# Patient Record
Sex: FEMALE | Race: WHITE
[De-identification: names, ages, dates, MRNs, and addresses within clinical notes are randomized per-mention and may not be internally consistent; named-entity substitution may affect disease eponyms.]

---

## 2017-01-11 ENCOUNTER — HOSPITAL ENCOUNTER (OUTPATIENT)
Dept: HOSPITAL 62 - END | Age: 77
Discharge: HOME | End: 2017-01-11
Attending: INTERNAL MEDICINE
Payer: MEDICARE

## 2017-01-11 VITALS — DIASTOLIC BLOOD PRESSURE: 60 MMHG | SYSTOLIC BLOOD PRESSURE: 103 MMHG

## 2017-01-11 DIAGNOSIS — E78.5: ICD-10-CM

## 2017-01-11 DIAGNOSIS — K75.81: ICD-10-CM

## 2017-01-11 DIAGNOSIS — K21.9: ICD-10-CM

## 2017-01-11 DIAGNOSIS — I10: ICD-10-CM

## 2017-01-11 DIAGNOSIS — K29.50: Primary | ICD-10-CM

## 2017-01-11 DIAGNOSIS — Z79.899: ICD-10-CM

## 2017-01-11 DIAGNOSIS — R01.1: ICD-10-CM

## 2017-01-11 PROCEDURE — 88342 IMHCHEM/IMCYTCHM 1ST ANTB: CPT

## 2017-01-11 PROCEDURE — 0DB68ZX EXCISION OF STOMACH, VIA NATURAL OR ARTIFICIAL OPENING ENDOSCOPIC, DIAGNOSTIC: ICD-10-PCS | Performed by: INTERNAL MEDICINE

## 2017-01-11 PROCEDURE — 88305 TISSUE EXAM BY PATHOLOGIST: CPT

## 2017-01-11 PROCEDURE — 43239 EGD BIOPSY SINGLE/MULTIPLE: CPT

## 2017-01-11 RX ADMIN — MIDAZOLAM HYDROCHLORIDE ONE MG: 1 INJECTION, SOLUTION INTRAMUSCULAR; INTRAVENOUS at 09:36

## 2017-01-11 RX ADMIN — MIDAZOLAM HYDROCHLORIDE ONE MG: 1 INJECTION, SOLUTION INTRAMUSCULAR; INTRAVENOUS at 09:30

## 2017-01-11 NOTE — OPERATIVE REPORT
Operative Report


DATE OF SURGERY: 01/11/17


Operative Report: 


The risks benefits and alternatives of the procedure explained to the patient 

in detail and informed consent is obtained that GIF Olympus video scope was 

inserted into the patient's mouth and hypopharynx the esophagus is identified 

intubated and insufflated the scope was then advanced through the esophagus 

stomach and duodenum retroflexion maneuver is done the esophagus stomach and 

first and second portions of the duodenum examined


PREOPERATIVE DIAGNOSIS: Blood in stool


POSTOPERATIVE DIAGNOSIS: Gastritis status post biopsy rule out Helicobacter 

pylori


OPERATION: EGD with biopsy


SURGEON: JANNY OMALLEY


ANESTHESIA: Moderate Sedation - 3 mg of Versed, 50 g of fentanyl.


TISSUE REMOVED OR ALTERED: Gastric specimens obtained to rule out for 

Helicobacter pylori


COMPLICATIONS: 


None.


ESTIMATED BLOOD LOSS: none.


INTRAOPERATIVE FINDINGS: Normal esophagus.  First and second portions of the 

duodenum are normal.


PROCEDURE: 


Patient tolerated the procedure well.


No immediate postprocedure complications are noted.


Patient is discharged in good condition.


Date of discharge 01/11/2017.


Discharge diet: Regular.


Discharge activity: Regular.


Patient does have a 2-3 follow-up to discuss findings.


We'll await on biopsies.


If biopsies are negative she may need evaluation from below.

## 2017-03-20 ENCOUNTER — HOSPITAL ENCOUNTER (OUTPATIENT)
Dept: HOSPITAL 62 - OD | Age: 77
End: 2017-03-20
Attending: SURGERY
Payer: MEDICARE

## 2017-03-20 DIAGNOSIS — K62.5: Primary | ICD-10-CM

## 2017-03-20 LAB
ERYTHROCYTE [DISTWIDTH] IN BLOOD BY AUTOMATED COUNT: 17.1 % (ref 11.5–14)
HCT VFR BLD CALC: 38.5 % (ref 36–47)
HGB BLD-MCNC: 12.8 G/DL (ref 12–15.5)
HGB HCT DIFFERENCE: -0.1
MCH RBC QN AUTO: 27.8 PG (ref 27–33.4)
MCHC RBC AUTO-ENTMCNC: 33.3 G/DL (ref 32–36)
MCV RBC AUTO: 84 FL (ref 80–97)
RBC # BLD AUTO: 4.6 10^6/UL (ref 3.72–5.28)
WBC # BLD AUTO: 7.6 10^3/UL (ref 4–10.5)

## 2017-03-20 PROCEDURE — 36415 COLL VENOUS BLD VENIPUNCTURE: CPT

## 2017-03-20 PROCEDURE — 85027 COMPLETE CBC AUTOMATED: CPT

## 2017-07-16 ENCOUNTER — HOSPITAL ENCOUNTER (EMERGENCY)
Dept: HOSPITAL 62 - ER | Age: 77
Discharge: HOME | End: 2017-07-16
Payer: MEDICARE

## 2017-07-16 VITALS — DIASTOLIC BLOOD PRESSURE: 76 MMHG | SYSTOLIC BLOOD PRESSURE: 141 MMHG

## 2017-07-16 DIAGNOSIS — R39.198: ICD-10-CM

## 2017-07-16 DIAGNOSIS — R32: Primary | ICD-10-CM

## 2017-07-16 DIAGNOSIS — I10: ICD-10-CM

## 2017-07-16 DIAGNOSIS — M25.562: ICD-10-CM

## 2017-07-16 LAB
APPEARANCE UR: CLEAR
BILIRUB UR QL STRIP: NEGATIVE
GLUCOSE UR STRIP-MCNC: NEGATIVE MG/DL
KETONES UR STRIP-MCNC: NEGATIVE MG/DL
NITRITE UR QL STRIP: NEGATIVE
PH UR STRIP: 6 [PH] (ref 5–9)
PROT UR STRIP-MCNC: NEGATIVE MG/DL
SP GR UR STRIP: 1.02
UROBILINOGEN UR-MCNC: NEGATIVE MG/DL (ref ?–2)

## 2017-07-16 PROCEDURE — 81001 URINALYSIS AUTO W/SCOPE: CPT

## 2017-07-16 PROCEDURE — 99283 EMERGENCY DEPT VISIT LOW MDM: CPT

## 2017-07-16 NOTE — ER DOCUMENT REPORT
ED GI/





- General


Chief Complaint: Urinary Problem


Stated Complaint: LEFT KNEE PAIN/INCONTINENCE


Time Seen by Provider: 17 09:36


Notes: 


The patient is a 77-year-old female, past medical history arthritis, 

hypertension, presents with 1 week of intermittent urinary incontinence and 1 

month of left knee pain.  She has had x-rays and Dopplers of her knee, saw 

orthopedics and had a steroid injection. She is taking Motrin and applying Icy 

Hot to her knee. She is requesting an MRI of her knee today.  Patient says that 

because of her knee, she is having difficulty making it to the bathroom now.  

She is using crutches.  Patient denies chest pain, shortness of breath, fevers, 

back pain, fecal incontinence, saddle anesthesia, knee injury, numbness or 

tingling.


TRAVEL OUTSIDE OF THE [place] IN LAST 30 DAYS: No





- Related Data


Allergies/Adverse Reactions: 


 





bimatoprost [From Lumigan] Allergy (Mild, Verified 17 09:03)


 IRRITATION


brimonidine tartrate [From Alphagan] Allergy (Mild, Verified 17 09:03)


 RASH, IRRITATION


niacin Adverse Reaction (Verified 17 09:03)


 











Past Medical History





- General


Information source: Patient





- Social History


Smoking Status: Never Smoker


Family History: Reviewed & Not Pertinent





- Past Medical History


Cardiac Medical History: Reports: Hx Hypercholesterolemia


   Denies: Hx Coronary Artery Disease, Hx Heart Attack, Hx Hypertension


Pulmonary Medical History: 


   Denies: Hx Asthma, Hx Bronchitis, Hx COPD, Hx Pneumonia


Neurological Medical History: Denies: Hx Cerebrovascular Accident, Hx Seizures


Renal/ Medical History: Denies: Hx Peritoneal Dialysis


GI Medical History: Reports: Hx Gastroesophageal Reflux Disease.  Denies: Hx 

Hepatitis, Hx Hiatal Hernia, Hx Ulcer


Musculoskeltal Medical History: Reports Hx Arthritis - GENERALIZED


Infectious Medical History: Denies: Hx Hepatitis


Past Surgical History: Reports: Hx Appendectomy, Hx Bowel Surgery, Hx  

Section.  Denies: Hx Hysterectomy, Hx Mastectomy, Hx Open Heart Surgery, Hx 

Pacemaker





- Immunizations


Hx Diphtheria, Pertussis, Tetanus Vaccination: Yes


Hx Pneumococcal Vaccination: 01





Review of Systems





- Review of Systems


Notes: 


REVIEW OF SYSTEMS:


CONSTITUTIONAL: -fevers, -chills


EENT: -eye pain, -difficulty swallowing, -nasal congestion


CARDIOVASCULAR:-chest pain, -syncope.


RESPIRATORY: -cough, -SOB


GASTROINTESTINAL:  -abdominal pain, -nausea, -vomiting, -diarrhea


GENITOURINARY: -dysuria, -hematuria, +urinary incontinence


MUSCULOSKELETAL: +left knee pain, -back pain, -neck pain


SKIN: -rash or skin lesions.


HEMATOLOGIC: -easy bruising or bleeding.


LYMPHATIC: -swollen, enlarged glands.


NEUROLOGICAL: -altered mental status or loss of consciousness, -headache, -

neurologic symptoms


PSYCHIATRIC: -anxiety, -depression.


ALL OTHER SYSTEMS REVIEWED AND NEGATIVE.





Physical Exam





- Vital signs


Vitals: 


 











Temp Pulse Resp BP Pulse Ox


 


 97.6 F   113 H  18   127/99 H  97 


 


 17 09:02  17 09:02  17 09:02  17 09:02  17 09:02














- Notes


Notes: 


PHYSICAL EXAMINATION:





GENERAL: Well-appearing, well-nourished and in no acute distress.





HEAD: Atraumatic, normocephalic.





EYES: Pupils equal round and reactive to light, extraocular movements intact, 

sclera anicteric, conjunctiva are normal.





ENT: nares patent, oropharynx clear without exudates.  Moist mucous membranes.





NECK: Normal range of motion, supple without lymphadenopathy





LUNGS: Breath sounds clear to auscultation bilaterally and equal.  No wheezes 

rales or rhonchi.





HEART: Regular rate and rhythm without murmurs





ABDOMEN: Soft, nontender, normoactive bowel sounds.  No guarding, no rebound.  

No masses appreciated.





EXTREMITIES: Tenderness over left medial knee, no redness or swelling; strong 

distal pulses





NEUROLOGICAL: Cranial nerves grossly intact.  Normal speech, normal gait.  

Normal sensory and motor exams.





PSYCH: Normal mood, normal affect.





SKIN: Warm, Dry, normal turgor, no rashes or lesions noted.





Course





- Re-evaluation


Re-evalutation: 


Patient appears well.  No evidence of septic joint. Her initial tachycardia on 

triage resolved and this was most likely caused by her knee pain when she was 

walking. She has already had x-rays and Dopplers of her left knee and is 

following up with orthopedics.  Told her that unfortunately we are unable to 

get an MRI of her knee today in the emergency room and to have this scheduled 

by her orthopedic surgeon.  Urine does not show any evidence of UTI.  

Instructed her to follow-up with gynecology/urology for further evaluation and 

treatment.





- Vital Signs


Vital signs: 


 











Temp Pulse Resp BP Pulse Ox


 


 97.6 F   113 H  18   127/99 H  97 


 


 17 09:02  17 09:02  17 09:02  17 09:02  17 09:02














- Laboratory


Laboratory results interpreted by me: 


 











  17





  10:55


 


Urine Blood  SMALL H














Discharge





- Discharge


Clinical Impression: 


Left knee pain


Qualifiers:


 Chronicity: chronic Qualified Code(s): M25.562 - Pain in left knee





Urinary incontinence


Qualifiers:


 Urinary Incontinence type: unspecified incontinence Qualified Code(s): R32 - 

Unspecified urinary incontinence





Condition: Stable


Disposition: HOME, SELF-CARE


Additional Instructions: 


Urinary Incontinence





     Urinary incontinence is unexpected leakage of urine from the bladder. It 

can be caused by damaged or weak pelvic muscles (such as after childbirth), 

bladder inflammation, weak bladder sphincter (valve), medications, prostate 

problems, and nerve problems. This is a common problem, especially in our older 

patients. 


     Treatment of incontinence depends on the severity, and on the underlying 

cause. We test for urinary tract infection. If we suspect a medicine may be 

contributing to the problem, we may stop it or lower the dosage.


     If the problem can't be corrected with medication or surgery, you may need 

to use absorbent underwear. For men, a "condom catheter" over the penis can 

collect urine. It's sometimes necessary to keep a catheter inside the bladder.


     The following are different types of urinary incontinence.


     Stress incontinence: A sudden burst of urine with cough or sneeze. This is 

common in women with multiple children. Pelvic muscle (Kegel) exercises may 

help. An incontinence pad catches the occasional accident. If severe, an 

operation to suspend of the bladder may correct the problem.


     Overflow incontinence: Leakage from an over-filled bladder. This type of 

incontinence is usually caused by an enlarged prostate or narrowed urethra. 

Prostatic obstruction can be treated with medication. Severe cases may require 

prostate surgery.


     Neurogenic bladder: Sudden emptying of the bladder due to injury or 

disease of the nerves that control the bladder, or overflow caused by lack of 

bladder contraction (atonic bladder). This usually requires a catheter, or the 

wearing of incontinence undergarments. A weak (atonic) bladder sometimes 

responds to medicine such as bethanechol (Urecholine).


     Overactive bladder: The bladder is irritable and tightens when it's not 

full. The sudden urge to urinate makes it hard to avoid passage of urine. This 

can often be treated with medication such as oxybutynin.


     Sphincter atony: The muscle that holds urine in the bladder is weak. This 

often causes incontinence at night. Medication such as imipramine or 

psuedoephedrine can sometimes help.





Arthritis





     Your symptoms are due to arthritis.  Arthritis is an inflammation of the 

joints.  There are many types -- osteoarthritis (due to "wear and tear"), auto-

immmune arthritis (such as rheumatoid, lupus, Milad's, and others), and crystal

-induced arthritis (such as gout and pseudogout).  The physician's examination, 

combined with laboratory tests, will determine the cause of your arthritis.


     All types of arthritis are treated with antiinflammatory medications.  

Other medication may be required for special types of arthritis, or if your 

problem does not respond to the antiinflammatory medicine.


     Local warmth may be helpful.  Move the involved joints through the full 

range of motion daily.  Mild exercise is usually still possible for most 

persons with arthritis (ask your physician).  Swimming provides good exercise 

without damaging the joints.


     Contact the physician if you are worsening in any way.





Arthralgia





     Arthralgia is pain in the joints. We use the word arthralgia to describe 

joint pain where there's no history of injury, no known joint disease, and the 

joints are normal to examination. Arthralgia can be a symptom of an acute 

illness, such as influenza, hepatitis, or serum sickness. Sometimes the joint 

pain comes before any other symptoms. Arthralgia can also be an early symptom 

of joint disease, such as rheumatoid arthritis or lupus.


     If arthralgia is accompanied by an acute illness that explains the joint 

pain, such as mononucleosis, no further testing needs to be done. When there's 

no clear reason for the pain, tests may be done to see if there's an 

inflammatory disease of the joints.


     The usual treatment is anti-inflammatory medication, such as ibuprofen. 

Joint aches can be soothed with a heating pad or hot compress.


     If joints remain painful more than a few days, you'll need testing and 

followup. Return if a joint becomes swollen, red, or severely painful.


Prescriptions: 


Acetaminophen with Codeine [Tylenol #3 Tablet] 1 each PO Q4HP PRN #10 tablet


 PRN Reason: 


Referrals: 


ANALILIA ZABALA MD [Primary Care Provider] - Follow up as needed


MICHAEL MONROY MD [ACTIVE STAFF] - Follow up as needed


VERONICA PATTERSON MD [ACTIVE STAFF] - Follow up as needed


ANALILIA HASSAN DO [[facility]] - Follow up as needed

## 2019-01-11 ENCOUNTER — HOSPITAL ENCOUNTER (INPATIENT)
Dept: HOSPITAL 62 - ER | Age: 79
LOS: 1 days | Discharge: ANOTHER HOSPITAL | DRG: 310 | End: 2019-01-12
Attending: INTERNAL MEDICINE | Admitting: INTERNAL MEDICINE
Payer: MEDICARE

## 2019-01-11 DIAGNOSIS — I35.0: ICD-10-CM

## 2019-01-11 DIAGNOSIS — Z90.49: ICD-10-CM

## 2019-01-11 DIAGNOSIS — E78.5: ICD-10-CM

## 2019-01-11 DIAGNOSIS — K21.9: ICD-10-CM

## 2019-01-11 DIAGNOSIS — M79.604: ICD-10-CM

## 2019-01-11 DIAGNOSIS — R00.0: ICD-10-CM

## 2019-01-11 DIAGNOSIS — I44.1: Primary | ICD-10-CM

## 2019-01-11 DIAGNOSIS — M79.605: ICD-10-CM

## 2019-01-11 DIAGNOSIS — I95.9: ICD-10-CM

## 2019-01-11 LAB
ADD MANUAL DIFF: NO
ALBUMIN SERPL-MCNC: 4.6 G/DL (ref 3.5–5)
ALP SERPL-CCNC: 50 U/L (ref 38–126)
ALT SERPL-CCNC: 19 U/L (ref 9–52)
ANION GAP SERPL CALC-SCNC: 10 MMOL/L (ref 5–19)
AST SERPL-CCNC: 24 U/L (ref 14–36)
BASOPHILS # BLD AUTO: 0 10^3/UL (ref 0–0.2)
BASOPHILS NFR BLD AUTO: 0.6 % (ref 0–2)
BILIRUB DIRECT SERPL-MCNC: 0.1 MG/DL (ref 0–0.4)
BILIRUB SERPL-MCNC: 0.5 MG/DL (ref 0.2–1.3)
BUN SERPL-MCNC: 23 MG/DL (ref 7–20)
CALCIUM: 10.1 MG/DL (ref 8.4–10.2)
CHLORIDE SERPL-SCNC: 104 MMOL/L (ref 98–107)
CK MB SERPL-MCNC: 1.23 NG/ML (ref ?–4.55)
CK MB SERPL-MCNC: 1.59 NG/ML (ref ?–4.55)
CK SERPL-CCNC: 83 U/L (ref 30–135)
CO2 SERPL-SCNC: 26 MMOL/L (ref 22–30)
EOSINOPHIL # BLD AUTO: 0.1 10^3/UL (ref 0–0.6)
EOSINOPHIL NFR BLD AUTO: 0.8 % (ref 0–6)
ERYTHROCYTE [DISTWIDTH] IN BLOOD BY AUTOMATED COUNT: 13.8 % (ref 11.5–14)
GLUCOSE SERPL-MCNC: 120 MG/DL (ref 75–110)
HCT VFR BLD CALC: 41.7 % (ref 36–47)
HGB BLD-MCNC: 13.8 G/DL (ref 12–15.5)
LYMPHOCYTES # BLD AUTO: 1.9 10^3/UL (ref 0.5–4.7)
LYMPHOCYTES NFR BLD AUTO: 27 % (ref 13–45)
MCH RBC QN AUTO: 28.3 PG (ref 27–33.4)
MCHC RBC AUTO-ENTMCNC: 33 G/DL (ref 32–36)
MCV RBC AUTO: 86 FL (ref 80–97)
MONOCYTES # BLD AUTO: 0.7 10^3/UL (ref 0.1–1.4)
MONOCYTES NFR BLD AUTO: 10.1 % (ref 3–13)
NEUTROPHILS # BLD AUTO: 4.4 10^3/UL (ref 1.7–8.2)
NEUTS SEG NFR BLD AUTO: 61.5 % (ref 42–78)
PLATELET # BLD: 130 10^3/UL (ref 150–450)
POTASSIUM SERPL-SCNC: 4.4 MMOL/L (ref 3.6–5)
PROT SERPL-MCNC: 7.5 G/DL (ref 6.3–8.2)
RBC # BLD AUTO: 4.87 10^6/UL (ref 3.72–5.28)
SODIUM SERPL-SCNC: 140.4 MMOL/L (ref 137–145)
TOTAL CELLS COUNTED % (AUTO): 100 %
TROPONIN I SERPL-MCNC: < 0.012 NG/ML
TROPONIN I SERPL-MCNC: < 0.012 NG/ML
WBC # BLD AUTO: 7.1 10^3/UL (ref 4–10.5)

## 2019-01-11 PROCEDURE — 93925 LOWER EXTREMITY STUDY: CPT

## 2019-01-11 PROCEDURE — 99285 EMERGENCY DEPT VISIT HI MDM: CPT

## 2019-01-11 PROCEDURE — 82553 CREATINE MB FRACTION: CPT

## 2019-01-11 PROCEDURE — 93306 TTE W/DOPPLER COMPLETE: CPT

## 2019-01-11 PROCEDURE — 96361 HYDRATE IV INFUSION ADD-ON: CPT

## 2019-01-11 PROCEDURE — 93005 ELECTROCARDIOGRAM TRACING: CPT

## 2019-01-11 PROCEDURE — 85025 COMPLETE CBC W/AUTO DIFF WBC: CPT

## 2019-01-11 PROCEDURE — 83880 ASSAY OF NATRIURETIC PEPTIDE: CPT

## 2019-01-11 PROCEDURE — 71275 CT ANGIOGRAPHY CHEST: CPT

## 2019-01-11 PROCEDURE — 80061 LIPID PANEL: CPT

## 2019-01-11 PROCEDURE — 36415 COLL VENOUS BLD VENIPUNCTURE: CPT

## 2019-01-11 PROCEDURE — 93010 ELECTROCARDIOGRAM REPORT: CPT

## 2019-01-11 PROCEDURE — 82550 ASSAY OF CK (CPK): CPT

## 2019-01-11 PROCEDURE — 70450 CT HEAD/BRAIN W/O DYE: CPT

## 2019-01-11 PROCEDURE — 80053 COMPREHEN METABOLIC PANEL: CPT

## 2019-01-11 PROCEDURE — 96360 HYDRATION IV INFUSION INIT: CPT

## 2019-01-11 PROCEDURE — 71045 X-RAY EXAM CHEST 1 VIEW: CPT

## 2019-01-11 PROCEDURE — 84484 ASSAY OF TROPONIN QUANT: CPT

## 2019-01-11 RX ADMIN — SODIUM CHLORIDE PRN MLS/HR: 9 INJECTION, SOLUTION INTRAVENOUS at 22:31

## 2019-01-11 RX ADMIN — ENOXAPARIN SODIUM SCH: 40 INJECTION SUBCUTANEOUS at 18:36

## 2019-01-11 RX ADMIN — Medication SCH: at 22:14

## 2019-01-11 NOTE — RADIOLOGY REPORT (SQ)
EXAM DESCRIPTION:  CT HEAD WITHOUT



COMPLETED DATE/TIME:  1/11/2019 4:21 pm



REASON FOR STUDY:  headache



COMPARISON:  7/8/2014.



TECHNIQUE:  Axial images acquired through the brain without intravenous contrast.  Images reviewed wi
th bone, brain and subdural windows.  Additional sagittal and coronal reconstructions were generated.
 Images stored on PACS.

All CT scanners at this facility use dose modulation, iterative reconstruction, and/or weight based d
osing when appropriate to reduce radiation dose to as low as reasonably achievable (ALARA).

[facility]: Dose Right  [facility]: CareDose    [facility]: Dose Right    [facility]: Teradose 4D    [facility]: Smart Technologies



RADIATION DOSE:  CT Rad equipment meets quality standard of care and radiation dose reduction techniq
ues were employed. CTDIvol: 53.2 mGy. DLP: 937 mGy-cm. mGy.



LIMITATIONS:  None.



FINDINGS:  VENTRICLES: Prominent.

CEREBRUM: No masses.  No hemorrhage.  No midline shift.  Areas of low density in the white matter mos
t likely due to chronic micro-vascular ischemic change.  No evidence for acute infarction.

CEREBELLUM: No masses.  No hemorrhage.  No alteration of density.  No evidence for acute infarction.

EXTRAAXIAL SPACES: Mild age-related involutional change.  No fluid collections.  No masses.

ORBITS AND GLOBE: No intra- or extraconal masses.  Normal contour of globe without masses.

CALVARIUM: No fracture.

PARANASAL SINUSES: No fluid or mucosal thickening.

SOFT TISSUES: No mass or hematoma.

OTHER: No other significant finding.



IMPRESSION:  MILD CHRONIC CHANGES OF ATROPHY AND MICROVASCULAR ISCHEMIA.  NO ACUTE PROCESS.

EVIDENCE OF ACUTE STROKE: NO.



TECHNICAL DOCUMENTATION:  JOB ID:  [id number]

Quality ID # [id number]: Final reports with documentation of one or more dose reduction techniques (e.g., Au
tomated exposure control, adjustment of the mA and/or kV according to patient size, use of iterative 
reconstruction technique)

 2011 [employer]- All Rights Reserved



Reading location - IP/workstation name: [facility]-RR2

## 2019-01-11 NOTE — ER DOCUMENT REPORT
ED Cardiac





- General


Chief Complaint: Fall


Stated Complaint: FALL


Time Seen by Provider: 19 10:22


Mode of Arrival: Ambulatory


Information source: Patient


Notes: 





History of Present Illness








Chief Complaint:[ chest pain]





[    78 years old female with a history of cardiac arrhythmia was seen by 

cardiologist and treated with medication which she cannot remember.  This 

morning when she woke up she was feeling heart beating faster but not 

significant enough but she went out had a breakfast and came back home.  The she

 started having dizziness with rapid heartbeat, appeared pale at one time, and 

chest pressure.  Therefore called EMS and came to the ED.





It was associated with diaphoresis, lightheadedness and dizziness.  Denies any 

left arm numbness tingling sensation on nausea.





Denies any difficulty in breathing.  Denies any blurring of vision, denies any 

focal weakness numbness tingling sensation.





By the time she was brought in to the ED by the EMS she was feeling comfortable.

  Except slight dizziness.





History obtained from [patient]


Symptoms began:[ today]


Onset: [gradual]


Timing: [constant, now gone]


Quality: ["pain"]


Intensity: [moderate]





Location: [Chest]


Radiation: [none]


Migration: [none]





Aggravating factors: [none]


Relieving factors: [none]





Major PE risk factors: [none]


Major aortic dissection risk factors: [none]





Review of Systems: All other systems negative as reviewed. 


CONSTITUTIONAL 


No fever, No chills, No sweats.


EYES 


No eye pain. 


ENT 


No URI symptoms, No sore throat, No ear pain.


CARDIOVASCULAR 


+ chest pain, No palpitations, No edema.


RESPIRATORY 


No Cough, No SOB, No wheezing. 


GASTROINTESTINAL 


No abdominal pain, No nausea, No diarrhea, No vomiting, No GI Bleeding.


GENITOURINARY 


No UTI symptoms.


MUSCULOSKELETAL 


No back pain, No calf swelling, No calf pain. 


SKIN 


No Rash.


NEUROLOGIC 


No Headache 





Physical Exam


CONSTITUTIONAL 


Vital signs reviewed, Patient appears comfortable, Alert and oriented X 3, 

Normal stature.  Pleasant female not seems to be in any acute distress


HEAD 


Atraumatic, Normocephalic.


EYES 


Eyes are normal to inspection, No discharge from eyes, Extraocular muscles 

intact, Sclera are normal, Conjunctiva are normal.


ENT 


Ears normal to inspection, Nose examination normal, Posterior pharynx normal, 

Mouth normal to inspection.


NECK 


Normal ROM, No jugular venous distention, No meningeal signs, no carotid bruit.


RESPIRATORY CHEST 


Chest is nontender, Breath sounds normal, No respiratory distress.


CARDIOVASCULAR 


RRR, No murmurs, Normal S1 S2, No rub, No gallop.


ABDOMEN 


Abdomen is nontender, No pulsatile masses, No other masses, Bowel sounds normal,

 No distension, No peritoneal signs, No hernias.


BACK 


There is no CVA Tenderness, There is no tenderness to palpation, Normal 

inspection.


UPPER EXTREMITY 


Inspection normal, No cyanosis, No clubbing, No edema, 2+ radial pulses.


LOWER EXTREMITY 


Inspection normal, No cyanosis, No clubbing, No edema, No calf tenderness, 2+ 

femoral pulses.


NEURO 


No focal motor deficits, No focal sensory deficits, Speech normal.


SKIN 


Skin is warm, Skin is dry, Skin is normal color.


LYMPHATIC 


No adenopathy in neck.


PSYCHIATRIC 


Normal affect. 





TRAVEL OUTSIDE OF THE [place] IN LAST 30 DAYS: No





- HPI


Notes: 





Dictated





- Related Data


Allergies/Adverse Reactions: 


                                        





bimatoprost [From Lumigan] Allergy (Mild, Verified 17 09:03)


   IRRITATION


brimonidine tartrate [From Alphagan] Allergy (Mild, Verified 17 09:03)


   RASH, IRRITATION


niacin Adverse Reaction (Verified 17 09:03)


   











Past Medical History





- Social History


Smoking Status: Never Smoker


Frequency of alcohol use: Rare


Lives with: Family


Family History: Reviewed & Not Pertinent





- Past Medical History


Cardiac Medical History: Reports: Hx Hypercholesterolemia


   Denies: Hx Coronary Artery Disease, Hx Heart Attack, Hx Hypertension


Pulmonary Medical History: 


   Denies: Hx Asthma, Hx Bronchitis, Hx COPD, Hx Pneumonia


Neurological Medical History: Denies: Hx Cerebrovascular Accident, Hx Seizures


Renal/ Medical History: Denies: Hx Peritoneal Dialysis


GI Medical History: Reports: Hx Gastroesophageal Reflux Disease.  Denies: Hx 

Hepatitis, Hx Hiatal Hernia, Hx Ulcer


Musculoskeletal Medical History: Reports Hx Arthritis - GENERALIZED


Infectious Medical History: Denies: Hx Hepatitis


Past Surgical History: Reports: Hx Appendectomy, Hx Bowel Surgery, Hx  

Section.  Denies: Hx Hysterectomy, Hx Mastectomy, Hx Open Heart Surgery, Hx 

Pacemaker





- Immunizations


Hx Diphtheria, Pertussis, Tetanus Vaccination: Yes


Hx Pneumococcal Vaccination: 01





Review of Systems





- Review of Systems


Notes: 





Dictated





Physical Exam





- Vital signs


Vitals: 


                                        











Resp Pulse Ox


 


 18   98 


 


 19 10:19  19 10:19














- Notes


Notes: 





Dictated





Course





- Re-evaluation


Re-evalutation: 





19 15:08


Given IV fluids





- Vital Signs


Vital signs: 


                                        











Temp Pulse Resp BP Pulse Ox


 


 99.0 F   120 H  18   102/72   95 


 


 19 04:00  19 07:00  19 04:00  19 04:00  19 04:00














- Laboratory


Result Diagrams: 


                                 19 04:25





                                 19 04:25


Laboratory results interpreted by me: 


                                        











  19





  09:50 09:50 14:17


 


Plt Count  130 L  


 


BUN   23 H 


 


Est GFR (Non-Af Amer)   59 L 


 


Glucose   120 H 


 


NT-Pro-B Natriuret Pep    511 H














- Diagnostic Test


Radiology reviewed: Reports reviewed - Reported by radiologist as unremarkable





- EKG Interpretation by Me


Rate: Tachycardia - Sinus tachycardia, normal axis no acute ST elevation ST 

depression.  T wave inversion in V5 V6.  Occasional PVC.





Discharge





- Discharge


Clinical Impression: 


 Chest pain, rule out acute myocardial infarction, Palpitation





Hypotension


Qualifiers:


 Hypotension type: unspecified hypotension type Qualified Code(s): I95.9 - 

Hypotension, unspecified





Condition: Fair


Disposition: ADMITTED AS INPATIENT


Admitting Provider: Hospitalist


Unit Admitted: Telemetry

## 2019-01-11 NOTE — PDOC H&P
History of Present Illness


Admission Date/PCP: 


  





  GORDON PEOPLES MD





Patient complains of: Came in with complaints of headaches and dizziness and 

palpitations in association with left-sided chest pain.


History of Present Illness: 


JESUS RODRIGUEZ is a 78 year old female with history of colon resection 

secondary to chronic bleeding, hyperlipidemia, gastroesophageal reflux disease, 

cardiac arrhythmia for unknown etiology came to the emergency room with compla

ints of on and off headaches heaviness in the head associated with shortness of 

breath palpitations.  According to the patient and family this is going on for a

while and for the last few days things are getting worse decided to came to the 

emergency room for further evaluation.  For this palpitations she saw 

cardiologist in [facility] they did a heart monitoring with the device and she 

was told that also electrical activity was abnormal in the heart but she is 

stable nothing to worry about and she was started on metoprolol.  She does not 

know further details.  This was happened 5 years ago.  The chest pain describing

today's left-sided chest pain heaviness in the chest associated with shortness 

of breath on and off pain associated with increasing activity.  No nausea or 

vomitings diarrhea.  Cough increasing pain in the both lower extremity with 

activity for the last month or 2.  Emergency room she is found to be in the 

heart rate of more than 120 with hypotension systolic blood pressure of 90s she 

was given 1 L of IV fluids.  At the time of examination blood pressure is still 

95/70.  With heart rate of 110.  EKG shows to me is atrial fibrillation without 

any P wave and troponin was negative.








Past Medical History


Cardiac Medical History: Reports: Hyperlipidema


   Denies: Coronary Artery Disease, Myocardial Infarction, Hypertension


Pulmonary Medical History: 


   Denies: Asthma, Bronchitis, Chronic Obstructive Pulmonary Disease (COPD), 

Pneumonia


Neurological Medical History: 


   Denies: Seizures


GI Medical History: Reports: Gastroesophageal Reflux Disease


   Denies: Hepatitis, Hiatal Hernia


Musculoskeltal Medical History: Reports: Arthritis - GENERALIZED


Hematology: 


   Denies: Anemia, Sickle Cell Disease





Past Surgical History


Past Surgical History: Reports: Appendectomy,  Section, Other - Near 

complete colon resection


   Denies: Amputation, Hysterectomy, Mastectomy, Pacemaker





Social History


Smoking Status: Never Smoker


Frequency of Alcohol Use: None


Hx Recreational Drug Use: No


Hx Prescription Drug Abuse: No





- Advance Directive


Resuscitation Status: Full Code





Family History


Family History: Reviewed & Not Pertinent


Parental Family History Reviewed: Yes - Family history of colon cancer and 

breast cancer.


Children Family History Reviewed: Yes


Sibling(s) Family History Reviewed.: Yes





Medication/Allergy


Allergies/Adverse Reactions: 


                                        





bimatoprost [From Lumigan] Allergy (Mild, Verified 17 09:03)


   IRRITATION


brimonidine tartrate [From Alphagan] Allergy (Mild, Verified 17 09:03)


   RASH, IRRITATION


niacin Adverse Reaction (Verified 17 09:03)


   











Review of Systems


Constitutional: ABSENT: fever(s), headache(s), weight gain, weight loss


Eyes: ABSENT: visual disturbances


Ears: ABSENT: hearing changes


Cardiovascular: PRESENT: chest pain, dyspnea on exertion.  ABSENT: edema, 

palpitations


Respiratory: ABSENT: hemoptysis


Musculoskeletal: PRESENT: other - Lower leg pains


Neurological: PRESENT: dizziness.  ABSENT: focal weakness, frequent falls, 

syncope


Psychiatric: ABSENT: anxiety, depression, hallucinations, homidical ideation





Physical Exam


Vital Signs: 


                                        











Temp Pulse Resp BP Pulse Ox


 


 97.8 F      16   92/69 L  96 


 


 19 10:22     19 11:04  19 11:04  19 11:04








                                 Intake & Output











 01/10/19 01/11/19 01/12/19





 06:59 06:59 06:59


 


Intake Total   1000


 


Balance   1000


 


Weight   72.575 kg











General appearance: PRESENT: no acute distress


Head exam: PRESENT: atraumatic


Eye exam: PRESENT: PERRLA


Mouth exam: PRESENT: moist


Neck exam: ABSENT: carotid bruit, JVD, lymphadenopathy, thyromegaly


Respiratory exam: PRESENT: clear to auscultation nieves.  ABSENT: rales, rhonchi, w

heezes


Cardiovascular exam: PRESENT: systolic murmur, tachycardia


GI/Abdominal exam: PRESENT: normal bowel sounds, soft, other - Surgical scar 

over the abdomen.  ABSENT: tenderness


Extremities exam: PRESENT: full ROM.  ABSENT: calf tenderness, clubbing, pedal 

edema


Neurological exam: PRESENT: alert, awake, oriented to person, oriented to place,

oriented to time, oriented to situation, CN II-XII grossly intact.  ABSENT: 

motor sensory deficit


Psychiatric exam: PRESENT: appropriate affect, normal mood.  ABSENT: homicidal 

ideation, suicidal ideation





Results


Laboratory Results: 


                                        





                                 19 09:50 





                                 19 09:50 





                                        











  19





  09:50 09:50


 


WBC  7.1 


 


RBC  4.87 


 


Hgb  13.8 


 


Hct  41.7 


 


MCV  86 


 


MCH  28.3 


 


MCHC  33.0 


 


RDW  13.8 


 


Plt Count  130 L 


 


Seg Neutrophils %  61.5 


 


Lymphocytes %  27.0 


 


Monocytes %  10.1 


 


Eosinophils %  0.8 


 


Basophils %  0.6 


 


Absolute Neutrophils  4.4 


 


Absolute Lymphocytes  1.9 


 


Absolute Monocytes  0.7 


 


Absolute Eosinophils  0.1 


 


Absolute Basophils  0.0 


 


Sodium   140.4


 


Potassium   4.4


 


Chloride   104


 


Carbon Dioxide   26


 


Anion Gap   10


 


BUN   23 H


 


Creatinine   0.92


 


Est GFR ( Amer)   > 60


 


Est GFR (Non-Af Amer)   59 L


 


Glucose   120 H


 


Calcium   10.1


 


Total Bilirubin   0.5


 


AST   24


 


ALT   19


 


Alkaline Phosphatase   50


 


Total Protein   7.5


 


Albumin   4.6








                                        











  19





  09:50 09:50 14:17


 


Creatine Kinase  83  


 


CK-MB (CK-2)   1.59 


 


Troponin I   < 0.012  < 0.012


 


NT-Pro-B Natriuret Pep   














  19





  14:17


 


Creatine Kinase 


 


CK-MB (CK-2) 


 


Troponin I 


 


NT-Pro-B Natriuret Pep  511 H











Impressions: 


                                        





Chest X-Ray  19 10:31


IMPRESSION:  NO ACUTE RADIOGRAPHIC FINDING IN THE CHEST.


 














Assessment & Plan





- Diagnosis


(1) Chest pain, rule out acute myocardial infarction


Is this a current diagnosis for this admission?: Yes   


Plan: 


2019-patient came in with low blood pressure palpitations and left-sided 

chest pain plan to put her on telemetry telemetry monitoring started on aspirin 

81 mg p.o. daily placed on atorvastatin 10 mg p.o. nightly cardiology GI consult

was requested for questionable atrial fibrillation echocardiogram was requested 

lipid panel was requested for tomorrow cardiac enzymes x3 were requested.  She 

was also placed on oxygen 2 L nasal cannula.  I am going to put her out for 

nitroglycerin but 0.4 mg every 5 minutes as needed for chest pain.  Going to 

schedule for the stress test tomorrow.








(2) Hypotension


Qualifiers: 


   Hypotension type: unspecified hypotension type   Qualified Code(s): I95.9 - 

Hypotension, unspecified   


Is this a current diagnosis for this admission?: Yes   


Plan: 


2019-patient was hypotensive when she came to the emergency room systolic 

blood pressure in the 90s with heart rate of more than 120 she was given 1 L of 

fluid in the ER still blood pressure at the time of my examination is 95/70.  

But rate is around 110.  Plan to continue IV fluids at 75 cc/h.  We are going to

watch for the fluid overload.  Hypertension may be secondary to poor oral 

intake.








(3) Palpitation


Is this a current diagnosis for this admission?: Yes   


Plan: 


2019 patient is complaining of palpitations and family is giving the 

history of this palpitations associated with pallor of the skin frequently the 

last several weeks EKG looks like it may be atrial fibrillation and.  Plan is to

do the echocardiogram cardiology consult cardiac enzymes x3 also started her on 

metoprolol 5 mg IV every 6 as needed for heart rate more than 110.








(4) History of colon resection


Is this a current diagnosis for this admission?: Yes   


Plan: 


2019-patient is given the history of colon resection near complete colonic 

resection secondary to severe continuous GI bleed of unknown cause as per the 

family the exact reason for bleed from the lower GI is unknown.  No complaints 

of blood in the stool this time.








(5) Hyperlipemia


Is this a current diagnosis for this admission?: Yes   


Plan: 


2019-patient is giving history of hyperlipidemia with elevated LDL and also

high HDL.  She was placed on atorvastatin 40 mg p.o. nightly.  I am going to 

check a lipid panel tomorrow.








(6) Leg pain, bilateral


Is this a current diagnosis for this admission?: Yes   


Plan: 


2019 patient is complaining of bilateral lower leg pains the pains are 

increasing with walking less than 5200 feet she is getting Requip at home but is

not helping.  I requested for arterial Doppler of the lower extremities.








- Time


Time Spent: 50 to 70 Minutes


Critical Time spent with patient: 15-24 minutes


Medications reviewed and adjusted accordingly: Yes


Anticipated discharge: Home

## 2019-01-11 NOTE — RADIOLOGY REPORT (SQ)
EXAM DESCRIPTION:  CHEST SINGLE VIEW



COMPLETED DATE/TIME:  1/11/2019 10:53 am



REASON FOR STUDY:  Chest pain



COMPARISON:  3/20/2012.



EXAM PARAMETERS:  NUMBER OF VIEWS: One view.

TECHNIQUE: Single frontal radiographic view of the chest acquired.

RADIATION DOSE: NA

LIMITATIONS: None.



FINDINGS:  LUNGS AND PLEURA: No opacities, masses or pneumothorax. No pleural effusion.

MEDIASTINUM AND HILAR STRUCTURES: No masses.  Contour normal.

HEART AND VASCULAR STRUCTURES: Heart normal in size.  Normal vasculature.  Ectatic aorta.

BONES: No acute findings.

HARDWARE: None in the chest.

OTHER: No other significant finding.



IMPRESSION:  NO ACUTE RADIOGRAPHIC FINDING IN THE CHEST.



TECHNICAL DOCUMENTATION:  JOB ID:  [id number]

 2011 [employer]- All Rights Reserved



Reading location - IP/workstation name: [email or web address]

## 2019-01-11 NOTE — XCELERA REPORT
[address] [address]

                               Tel: [phone]

                               Fax: [phone]



                      Transthoracic Echocardiogram Report

_______________________________________________________________________________



Name: JESUS RODRIGUEZ

MRN: [id number]                           Age: 78 yrs

Gender: Female                            : 1940

Patient Status: Inpatient                 Patient Location: [address] [place], [place] [address]

Account #: [id number]

Study Date: 2019 05:50 PM

Accession #: [id number]

_______________________________________________________________________________



Height: 59 in        Weight: 160 lb        BSA: 1.7 m2

_______________________________________________________________________________

Procedure: A two-dimensional transthoracic echocardiogram with color flow and

Doppler was performed. The study was technically difficult with many images

being suboptimal in quality. Images were not obtained from all of the standard

acoustic windows due to the limited scope of the study.

Reason For Study: A FIB



History: ATRIAL FIBRILLATION.

Ordering Physician: NINA DIXON



Performed By: Rossana Garcia

_______________________________________________________________________________



Interpretation Summary

The left ventricle is normal in size.

There is normal left ventricular wall thickness.

There is no thrombus.

The right ventricle is not well visualized secondary to technical limitations

The right ventricle is grossly normal size.

The right atrium is normal.

The left atrial size is normal.

There is no evidence of mitral valve prolapse.

There is no vegetation seen on the mitral valve.

There is no mitral valve stenosis.

There is a mild amount of mitral regurgitation

There is no aortic valvular vegetation.

There is mild aortic stenosis

There is a peak gradient of 23 mm of Hg.

There is no LVOT obstruction.

No aortic regurgitation is present.

There is no tricuspid stenosis.

There is a mild amount of tricuspid regurgitation

Right ventricular systolic pressure is normal.

RVSP is 21 to 26 mm of Hg , wih RA mean of to 10.

The inferior vena cava appeared normal and decreased > 50% with respiration

(RAP 5-10 mmHg)

There is no pericardial effusion.



MMode/2D Measurements & Calculations

RVDd: 2.2 cm  LVIDd: 4.3 cm    FS: 33.4 %            Ao root diam: 2.2 cm



IVSd: 1.0 cm  LVIDs: 2.9 cm    EDV(Teich): 83.4 ml   Ao root area: 3.7 cm2

              LVPWd: 0.99 cm   ESV(Teich): 31.3 ml   LA dimension: 3.2 cm

                               EF(Teich): 62.4 %



Doppler Measurements & Calculations

MV E max amy:      MV P1/2t max amy:     Ao V2 max:         LV V1 max P.5 cm/sec       200.6 cm/sec          238.1 cm/sec       12.6 mmHg

MV A max amy:      MV P1/2t: 69.1 msec   Ao max PG:         LV V1 mean P.1 cm/sec        MVA(P1/2t): 3.2 cm2   22.7 mmHg          8.6 mmHg

MV E/A: 2.8        MV dec slope:         Ao V2 mean:        LV V1 max:

                                         176.4 cm/sec       176.5 cm/sec

                   850.2 cm/sec2         Ao mean PG:        LV V1 mean:

                   MV dec time: 0.15 sec 14.4 mmHg          132.6 cm/sec

                                         Ao V2 VTI: 46.0 cm LV V1 VTI: 35.5 cm

        _______________________________________________________________

PA V2 max:         TR max amy:           MV P1/2t-pr_phl:

142.5 cm/sec       196.1 cm/sec          69.1 msec



PA max P.1 mmHgTR max PG: 15.4 mmHg



Left Ventricle

The left ventricle is normal in size. There is normal left ventricular wall

thickness. No True apical 2 chamber  views obtained.Hence cannot comment on

the apical anterior , the basal anterior, the basal inferior and apical

inferior walls.The mid anterior , the mid inferior and the rest of the LV

walls contract normally. .Normal LVEF is normal and is greater than 60% in the

limited views. LV diastolic function could not be adequately assessed due to

atrial fibrilation. There is no thrombus.



Right Ventricle

The right ventricle is not well visualized secondary to technical limitations.

The right ventricle is grossly normal size.



Atria

The right atrium is normal. The left atrial size is normal.



Mitral Valve

There is mild to moderate mitral annular calcification. There is no evidence

of mitral valve prolapse. There is no vegetation seen on the mitral valve.

There is no mitral valve stenosis. There is a mild amount of mitral

regurgitation.





Aortic Valve

There is no aortic valvular vegetation. There is mild aortic stenosis. There

is a peak gradient of 23 mm of Hg. There is no LVOT obstruction. No aortic

regurgitation is present.



Tricuspid Valve

There is no tricuspid stenosis. There is a mild amount of tricuspid

regurgitation. Right ventricular systolic pressure is normal. RVSP is 21 to 26

mm of Hg , wih RA mean of to 10.



Pulmonic Valve

There is no pulmonic valvular stenosis. There is no pulmonic valvular

regurgitation.



Great Vessels

The aortic root is normal size. The inferior vena cava appeared normal and

decreased > 50% with respiration (RAP 5-10 mmHg).





Effusions

There is no pericardial effusion.



_______________________________________________________________________________



_______________________________________________________________________________

Electronically signed by:      Antonieta Arenas      on 2019 11:30 PM



CC: NINA DIXON

>

Antonieta Arenas

## 2019-01-12 VITALS — SYSTOLIC BLOOD PRESSURE: 108 MMHG | DIASTOLIC BLOOD PRESSURE: 57 MMHG

## 2019-01-12 LAB
ADD MANUAL DIFF: NO
ALBUMIN SERPL-MCNC: 3.3 G/DL (ref 3.5–5)
ALP SERPL-CCNC: 42 U/L (ref 38–126)
ALT SERPL-CCNC: 28 U/L (ref 9–52)
ANION GAP SERPL CALC-SCNC: 7 MMOL/L (ref 5–19)
AST SERPL-CCNC: 19 U/L (ref 14–36)
BASOPHILS # BLD AUTO: 0 10^3/UL (ref 0–0.2)
BASOPHILS NFR BLD AUTO: 0.5 % (ref 0–2)
BILIRUB DIRECT SERPL-MCNC: 0.1 MG/DL (ref 0–0.4)
BILIRUB SERPL-MCNC: 0.3 MG/DL (ref 0.2–1.3)
BUN SERPL-MCNC: 19 MG/DL (ref 7–20)
CALCIUM: 8.5 MG/DL (ref 8.4–10.2)
CHLORIDE SERPL-SCNC: 110 MMOL/L (ref 98–107)
CHOLEST SERPL-MCNC: 173.5 MG/DL (ref 0–200)
CK MB SERPL-MCNC: 1.04 NG/ML (ref ?–4.55)
CK SERPL-CCNC: 62 U/L (ref 30–135)
CO2 SERPL-SCNC: 23 MMOL/L (ref 22–30)
EOSINOPHIL # BLD AUTO: 0.2 10^3/UL (ref 0–0.6)
EOSINOPHIL NFR BLD AUTO: 2.7 % (ref 0–6)
ERYTHROCYTE [DISTWIDTH] IN BLOOD BY AUTOMATED COUNT: 14 % (ref 11.5–14)
GLUCOSE SERPL-MCNC: 113 MG/DL (ref 75–110)
HCT VFR BLD CALC: 35.7 % (ref 36–47)
HGB BLD-MCNC: 11.9 G/DL (ref 12–15.5)
LDLC SERPL DIRECT ASSAY-MCNC: 100 MG/DL (ref ?–100)
LYMPHOCYTES # BLD AUTO: 1.8 10^3/UL (ref 0.5–4.7)
LYMPHOCYTES NFR BLD AUTO: 29.6 % (ref 13–45)
MCH RBC QN AUTO: 28.6 PG (ref 27–33.4)
MCHC RBC AUTO-ENTMCNC: 33.4 G/DL (ref 32–36)
MCV RBC AUTO: 86 FL (ref 80–97)
MONOCYTES # BLD AUTO: 0.7 10^3/UL (ref 0.1–1.4)
MONOCYTES NFR BLD AUTO: 11.8 % (ref 3–13)
NEUTROPHILS # BLD AUTO: 3.3 10^3/UL (ref 1.7–8.2)
NEUTS SEG NFR BLD AUTO: 55.4 % (ref 42–78)
NT PRO BNP: 1690 PG/ML (ref ?–450)
PLATELET # BLD: 117 10^3/UL (ref 150–450)
POTASSIUM SERPL-SCNC: 4.3 MMOL/L (ref 3.6–5)
PROT SERPL-MCNC: 5.8 G/DL (ref 6.3–8.2)
RBC # BLD AUTO: 4.16 10^6/UL (ref 3.72–5.28)
SODIUM SERPL-SCNC: 140 MMOL/L (ref 137–145)
TOTAL CELLS COUNTED % (AUTO): 100 %
TRIGL SERPL-MCNC: 191 MG/DL (ref ?–150)
TROPONIN I SERPL-MCNC: < 0.012 NG/ML
VLDLC SERPL CALC-MCNC: 38.2 MG/DL (ref 10–31)
WBC # BLD AUTO: 6 10^3/UL (ref 4–10.5)

## 2019-01-12 RX ADMIN — SODIUM CHLORIDE PRN MLS/HR: 9 INJECTION, SOLUTION INTRAVENOUS at 10:36

## 2019-01-12 RX ADMIN — ENOXAPARIN SODIUM SCH: 40 INJECTION SUBCUTANEOUS at 10:29

## 2019-01-12 RX ADMIN — Medication SCH: at 14:07

## 2019-01-12 RX ADMIN — Medication SCH: at 05:02

## 2019-01-12 NOTE — EKG REPORT
SEVERITY:- ABNORMAL ECG -

SINUS RHYTHM

PAIRED VENTRICULAR PREMATURE COMPLEXES

SECOND DEGREE 2:1 AV BLOCK

NONSPECIFIC T ABNORMALITIES, DIFFUSE LEADS

:

Confirmed by: Franklin Middleton MD 12-Jan-2019 12:10:14

## 2019-01-12 NOTE — PDOC PROGRESS REPORT
Subjective


Progress Note for:: 01/12/19


Subjective:: 





1/12/2019 no acute events in the last 24 hours.  Patient is afebrile.  She has a

CT of the chest was done to rule out PE which was negative.  EKG indicates she 

has a junctional rhythm every time she stands up with minimal activity heart 

rate is going up to 140s.  Dr. Dagoberto NATARAJAN recommended to give digoxin 25 mg IV

1 dose.  Patient is getting IV fluids still the blood pressure is 102/72.  But 

the patient states she is feeling much better.  She is complaining of back pain 

she is requesting something like Percocets as needed for that problem.


Reason For Visit: 


HYPOTENSION/ATRIAL FIB








Physical Exam


Vital Signs: 


                                        











Temp Pulse Resp BP Pulse Ox


 


 99.0 F   120 H  18   102/72   95 


 


 01/12/19 04:00  01/12/19 07:00  01/12/19 04:00  01/12/19 04:00  01/12/19 04:00








                                 Intake & Output











 01/11/19 01/12/19 01/13/19





 06:59 06:59 06:59


 


Intake Total  1220 


 


Balance  1220 


 


Weight  73.8 kg 











General appearance: PRESENT: no acute distress


Head exam: PRESENT: atraumatic


Eye exam: PRESENT: PERRLA


Mouth exam: PRESENT: dry mucosa


Neck exam: ABSENT: carotid bruit, JVD, lymphadenopathy, thyromegaly


Respiratory exam: PRESENT: clear to auscultation nieves.  ABSENT: rales, rhonchi, 

wheezes


Cardiovascular exam: PRESENT: systolic murmur, tachycardia


GI/Abdominal exam: PRESENT: normal bowel sounds, soft, other - Surgical scar 

over the abdomen.  ABSENT: distended, guarding, mass, organolmegaly, rebound, 

tenderness


Extremities exam: PRESENT: full ROM.  ABSENT: calf tenderness, clubbing, pedal 

edema


Neurological exam: PRESENT: alert, awake, oriented to person, oriented to place,

oriented to time, oriented to situation, CN II-XII grossly intact.  ABSENT: 

motor sensory deficit


Psychiatric exam: PRESENT: appropriate affect, normal mood.  ABSENT: homicidal 

ideation, suicidal ideation





Results


Laboratory Results: 


                                        





                                 01/12/19 04:25 





                                 01/12/19 04:25 





                                        











  01/11/19 01/11/19 01/12/19





  09:50 09:50 04:25


 


WBC  7.1  


 


RBC  4.87  


 


Hgb  13.8  


 


Hct  41.7  


 


MCV  86  


 


MCH  28.3  


 


MCHC  33.0  


 


RDW  13.8  


 


Plt Count  130 L  


 


Seg Neutrophils %  61.5  


 


Lymphocytes %  27.0  


 


Monocytes %  10.1  


 


Eosinophils %  0.8  


 


Basophils %  0.6  


 


Absolute Neutrophils  4.4  


 


Absolute Lymphocytes  1.9  


 


Absolute Monocytes  0.7  


 


Absolute Eosinophils  0.1  


 


Absolute Basophils  0.0  


 


Sodium   140.4  140.0


 


Potassium   4.4  4.3


 


Chloride   104  110 H


 


Carbon Dioxide   26  23


 


Anion Gap   10  7


 


BUN   23 H  19


 


Creatinine   0.92  0.66


 


Est GFR ( Amer)   > 60  > 60


 


Est GFR (Non-Af Amer)   59 L  > 60


 


Glucose   120 H  113 H


 


Calcium   10.1  8.5


 


Total Bilirubin   0.5  0.3


 


AST   24  19


 


ALT   19  28


 


Alkaline Phosphatase   50  42


 


Total Protein   7.5  5.8 L


 


Albumin   4.6  3.3 L


 


Triglycerides    191 H


 


Cholesterol    173.50


 


LDL Cholesterol Direct    100


 


VLDL Cholesterol    38.2 H


 


HDL Cholesterol    29 L














  01/12/19





  04:25


 


WBC  6.0


 


RBC  4.16


 


Hgb  11.9 L


 


Hct  35.7 L


 


MCV  86


 


MCH  28.6


 


MCHC  33.4


 


RDW  14.0


 


Plt Count  117 L


 


Seg Neutrophils %  55.4


 


Lymphocytes %  29.6


 


Monocytes %  11.8


 


Eosinophils %  2.7


 


Basophils %  0.5


 


Absolute Neutrophils  3.3


 


Absolute Lymphocytes  1.8


 


Absolute Monocytes  0.7


 


Absolute Eosinophils  0.2


 


Absolute Basophils  0.0


 


Sodium 


 


Potassium 


 


Chloride 


 


Carbon Dioxide 


 


Anion Gap 


 


BUN 


 


Creatinine 


 


Est GFR ( Amer) 


 


Est GFR (Non-Af Amer) 


 


Glucose 


 


Calcium 


 


Total Bilirubin 


 


AST 


 


ALT 


 


Alkaline Phosphatase 


 


Total Protein 


 


Albumin 


 


Triglycerides 


 


Cholesterol 


 


LDL Cholesterol Direct 


 


VLDL Cholesterol 


 


HDL Cholesterol 








                                        











  01/11/19 01/11/19 01/11/19





  09:50 09:50 14:17


 


Creatine Kinase  83  


 


CK-MB (CK-2)   1.59 


 


Troponin I   < 0.012  < 0.012


 


NT-Pro-B Natriuret Pep   














  01/11/19 01/11/19 01/11/19





  14:17 14:17 14:17


 


Creatine Kinase   61 


 


CK-MB (CK-2)    1.36


 


Troponin I    Cancelled


 


NT-Pro-B Natriuret Pep  511 H  














  01/11/19 01/11/19 01/12/19





  19:56 19:56 04:25


 


Creatine Kinase  51   62


 


CK-MB (CK-2)   1.23 


 


Troponin I   < 0.012 


 


NT-Pro-B Natriuret Pep   














  01/12/19





  04:25


 


Creatine Kinase 


 


CK-MB (CK-2)  1.04


 


Troponin I  < 0.012


 


NT-Pro-B Natriuret Pep  1690 H











Impressions: 


                                        





Head CT  01/11/19 00:00


IMPRESSION:  MILD CHRONIC CHANGES OF ATROPHY AND MICROVASCULAR ISCHEMIA.  NO 

ACUTE PROCESS.


EVIDENCE OF ACUTE STROKE: NO.


 








Chest X-Ray  01/11/19 10:31


IMPRESSION:  NO ACUTE RADIOGRAPHIC FINDING IN THE CHEST.


 








Chest/Abdomen CTA  01/12/19 00:00


IMPRESSION:  No pulmonary emboli.


 














Assessment & Plan





- Diagnosis


(1) Chest pain, rule out acute myocardial infarction


Is this a current diagnosis for this admission?: Yes   


Plan: 


1/11/2019-patient came in with low blood pressure palpitations and left-sided 

chest pain plan to put her on telemetry telemetry monitoring started on aspirin 

81 mg p.o. daily placed on atorvastatin 10 mg p.o. nightly cardiology GI consult

was requested for questionable atrial fibrillation echocardiogram was requested 

lipid panel was requested for tomorrow cardiac enzymes x3 were requested.  She 

was also placed on oxygen 2 L nasal cannula.  I am going to put her out for 

nitroglycerin but 0.4 mg every 5 minutes as needed for chest pain.  Going to 

schedule for the stress test tomorrow.





1/12/2019-patient denies any chest pain since the admission.  She still 

complaining of palpitations with minimal activity heart rate is going up to 140 

she is in junctional rhythm she is going to get digoxin 0.25 mg IV 1 dose.  CT 

of the chest was negative for PE.  Echocardiogram was done EF is more than 60%. 

No evidence of congestive heart failure.








(2) Hypotension


Qualifiers: 


   Hypotension type: unspecified hypotension type   Qualified Code(s): I95.9 - 

Hypotension, unspecified   


Is this a current diagnosis for this admission?: Yes   


Plan: 


1/11/2019-patient was hypotensive when she came to the emergency room systolic 

blood pressure in the 90s with heart rate of more than 120 she was given 1 L of 

fluid in the ER still blood pressure at the time of my examination is 95/70.  

But rate is around 110.  Plan to continue IV fluids at 75 cc/h.  We are going to

watch for the fluid overload.  Hypertension may be secondary to poor oral 

intake.





1/12/2019-patient is getting IV fluids at 50 cc/h blood pressure is 102/72 

patient denies any syncope or dizziness.  Plan is to continue the IV hydration. 

Hypotension most likely secondary to poor oral intake.








(3) Palpitation


Is this a current diagnosis for this admission?: Yes   


Plan: 


1/11/2019 patient is complaining of palpitations and family is giving the 

history of this palpitations associated with pallor of the skin frequently the 

last several weeks EKG looks like it may be atrial fibrillation and.  Plan is to

do the echocardiogram cardiology consult cardiac enzymes x3 also started her on 

metoprolol 5 mg IV every 6 as needed for heart rate more than 110.





1/12/2019-EKG shows junctional rhythm base at rest heart rate is more than 110 

and with activity is going to 140 we are going to give 1 dose of digoxin 0.25 

mcg.  Dr. Arenas is following the patient.








(4) History of colon resection


Is this a current diagnosis for this admission?: Yes   


Plan: 


1/11/2019-patient is given the history of colon resection near complete colonic 

resection secondary to severe continuous GI bleed of unknown cause as per the 

family the exact reason for bleed from the lower GI is unknown.  No complaints 

of blood in the stool this time.





1/12/2019-plan is to continue the present management.








(5) Hyperlipemia


Is this a current diagnosis for this admission?: Yes   


Plan: 


1/11/2019-patient is giving history of hyperlipidemia with elevated LDL and also

high HDL.  She was placed on atorvastatin 40 mg p.o. nightly.  I am going to 

check a lipid panel tomorrow.





1/12/2019-lipid profile was done this morning total cholesterol is 173, LDL is 

100 HDL is 29.  Plan is to continue atorvastatin today.








(6) Leg pain, bilateral


Is this a current diagnosis for this admission?: Yes   


Plan: 


1/11/2019 patient is complaining of bilateral lower leg pains the pains are 

increasing with walking less than 5200 feet she is getting Requip at home but is

not helping.  I requested for arterial Doppler of the lower extremities.





1/12/2018-patient is came in With complaints of severe bilateral lower extremity

pains.  I requested for arterial Doppler of the lower extremities.  Results are 

pending.








- Time


Time Spent with patient: 15-24 minutes


Medications reviewed and adjusted accordingly: Yes


Anticipated discharge: Home

## 2019-01-12 NOTE — PDOC TRANSFER SUMMARY
General


Admission Date/PCP: 


  01/11/19 16:06





  GORDON PEOPLES MD





Resuscitation Status: Full Code





- Transfer Diagnosis


(1) Second degree heart block


Is this a current diagnosis for this admission?: Yes   


Diagnosis Summary: 


1/12/2019 EKGs and rhythm strips are showing first-degree heart block along with

a second-degree 2 is 2 1 heart block probably Mobitz type II.


Dr. Arenas spoke to Dr. Vasquez in [facility] made arrangements for the 

patient to be transferred there as I mentioned in the H&P patient came in with 

shortness of breath and with minimal activity heart rate is going up in the 140s

150s also complained of left-sided chest pain when she came into the emergency 

room.  On physical examination patient has systolic heart murmur most likely 

from aortic stenosis.








(2) Chest pain, rule out acute myocardial infarction


Is this a current diagnosis for this admission?: Yes   


Diagnosis Summary: 


1/11/2019-patient came in with low blood pressure palpitations and left-sided 

chest pain plan to put her on telemetry telemetry monitoring started on aspirin 

81 mg p.o. daily placed on atorvastatin 10 mg p.o. nightly cardiology GI consult

was requested for questionable atrial fibrillation echocardiogram was requested 

lipid panel was requested for tomorrow cardiac enzymes x3 were requested.  She 

was also placed on oxygen 2 L nasal cannula.  I am going to put her out for 

nitroglycerin but 0.4 mg every 5 minutes as needed for chest pain.  Going to 

schedule for the stress test tomorrow.








1/12/2019-patient came in with left-sided chest pain low blood pressures and 

palpitations and her troponins are negative and echocardiogram shows EF of 60% 

but the EKG shows first-degree heart block along with second-degree heart block 

to 2 to 1 ratio most likely Mobitz type II so patient was going to [facility] 

for further management.  Be going to stop her metoprolol and continue the 

prophylaxis with Lovenox.








(3) Hypotension


Is this a current diagnosis for this admission?: Yes   


Diagnosis Summary: 


1/11/2019-patient was hypotensive when she came to the emergency room systolic 

blood pressure in the 90s with heart rate of more than 120 she was given 1 L of 

fluid in the ER still blood pressure at the time of my examination is 95/70.  

But rate is around 110.  Plan to continue IV fluids at 75 cc/h.  We are going to

watch for the fluid overload.  Hypertension may be secondary to poor oral 

intake.








1/12/2019-patient came in with hypotension she got more than a liter of fluid in

the emergency room initial blood pressure was 190/60 with fluids it was improved

to 95/70 at the time of admission IV fluids are continued at the rate of 50 cc/h

her blood pressure this morning is 102/62 she is still tachycardic at rest with 

heart rate is 110s and with activity minimal activity heart rate is going up to 

140s cardiology consult was requested and as I mentioned above patient is going 

to [facility] for further management.








(4) Palpitation


Is this a current diagnosis for this admission?: Yes   


Diagnosis Summary: 


1/12/2019-this morning EKG shows junctional rhythm and follow-up EKGs indicates 

first-degree heart block along with second-degree heart block with 2 used to 1 

ratio most likely Mobitz type II.  She was given a dose of digoxin 0.25 mcg this

morning  without much success.








(5) History of colon resection


Is this a current diagnosis for this admission?: Yes   


Diagnosis Summary: 


1/11/2019-patient is given the history of colon resection near complete colonic 

resection secondary to severe continuous GI bleed of unknown cause as per the 

family the exact reason for bleed from the lower GI is unknown.  No complaints 

of blood in the stool this time.








1/12/2019 plan is to continue the present management.








(6) Hyperlipemia


Is this a current diagnosis for this admission?: Yes   


Diagnosis Summary: 


1/11/2019-patient is giving history of hyperlipidemia with elevated LDL and also

high HDL.  She was placed on atorvastatin 40 mg p.o. nightly.  I am going to 

check a lipid panel tomorrow.








1/12/2019-patient lipid panel shows elevated cholesterol and low HDL with e

levated LDL she is on atorvastatin 40 mg p.o. daily.








(7) Leg pain, bilateral


Is this a current diagnosis for this admission?: Yes   





- Transfer Medications


Home Medications: 


                                        





Ergocalciferol (Vitamin D2) [Drisdol 50,000 Unit (1.25MG) Capsule] 50,000 unit 

PO ALVA@1000 01/11/19 


Esomeprazole Magnesium [Nexium 24Hr] 20 mg PO BID 01/11/19 


Fenofibrate Nanocrystallized [Tricor 48 mg Tablet] 48 mg PO QHS 01/11/19 


Hydrochlorothiazide [Hydrodiuril 12.5 mg Tablet] 12.5 mg PO DAILY 01/11/19 


Loratadine [Allergy] 10 mg PO DAILY 01/11/19 


Metoprolol Succinate [Toprol Xl 25 mg Tab.sr] 25 mg PO DAILY 01/11/19 


Pramipexole Di-HCl [Pramipexole Dihydrochloride] 1 mg PO QHS 01/11/19 








Transfer Medications: 


                               Current Medications





Aspirin (Aspirin 81 Mg Chewable Tablet)  81 mg PO DAILY [facility]


   Stop: 02/11/19 09:59


   Last Admin: 01/12/19 10:36 Dose:  81 mg


   Documented by: 


Atorvastatin Calcium (Lipitor 40 Mg Tablet)  40 mg PO QHS [facility]


   Stop: 02/11/19 09:59


   Last Admin: 01/12/19 10:36 Dose:  40 mg


   Documented by: 


Cholecalciferol (Vitamin D3 1000 Unit Tablet)  5,000 unit PO [id number] [facility]


   Stop: 02/11/19 07:59


   Last Admin: 01/12/19 10:42 Dose:  Not Given


   Documented by: 


Enoxaparin Sodium (Lovenox Inj 40 Mg/0.4 Ml Disp.Syrin)  40 mg SUBCUT DAILY [facility]


   Stop: 02/10/19 16:59


   Last Admin: 01/12/19 10:29 Dose:  Not Given


   Documented by: 


Ergocalciferol (Drisdol 50,000 Unit (1.25mg) Capsule)  50,000 unit PO ALVA@1000 

[facility]


   Stop: 02/12/19 09:59


Fenofibrate (Tricor 48 Mg Tablet)  48 mg PO QHS [facility]


   Stop: 02/11/19 21:59


Sodium Chloride (Nacl 0.9% 1000 Ml Iv Soln)  1,000 mls @ 50 mls/hr IV CONTINUOUS

PRN


   PRN Reason: THIS MED IS NOT "PRN"


   Stop: 02/10/19 15:52


   Last Admin: 01/12/19 10:36 Dose:  50 mls/hr


   Documented by: 


Lansoprazole (Prevacid 30 Mg Odt Tablet)  30 mg PO Q6AM [facility]


   Stop: 02/11/19 05:59


   Last Admin: 01/12/19 05:02 Dose:  Not Given


   Documented by: 


Loratadine (Claritin 10 Mg Tablet)  10 mg PO DAILY [facility]


   Stop: 02/11/19 09:59


   Last Admin: 01/12/19 10:36 Dose:  10 mg


   Documented by: 


Metoprolol Succinate (Toprol Xl 50 Mg Tab.Sr)  50 mg PO QHS [facility]


   Stop: 02/10/19 21:59


   Last Admin: 01/11/19 22:13 Dose:  Not Given


   Documented by: 


Metoprolol Succinate (Toprol Xl 25 Mg Tab.Sr)  25 mg PO DAILY [facility]


   Stop: 02/11/19 09:59


   Last Admin: 01/12/19 10:37 Dose:  25 mg


   Documented by: 


Metoprolol Tartrate (Lopressor Inj/Pf 5 Mg/5 Ml Sdv)  5 mg IV PRN PRN


   PRN Reason: GIVE FOR HR > []


   Stop: 02/10/19 15:57


Pramipexole Dihydrochloride (Mirapex 0.5 Mg Tablet)  0.5 mg PO QHS [facility]


   Stop: 02/10/19 21:59


   Last Admin: 01/11/19 22:12 Dose:  0.5 mg


   Documented by: 


Pramipexole Dihydrochloride (Mirapex 0.5 Mg Tablet)  1 mg PO QHS [facility]


   Stop: 02/11/19 21:59


Sodium Chloride (Saline Flush 2.5 Ml Monoject Prefil Syrin)  2.5 ml IV Q8 [facility]


   Stop: 02/10/19 21:59


   Last Admin: 01/12/19 05:02 Dose:  Not Given


   Documented by: 











- Allergies


Allergies/Adverse Reactions: 


                                        





bimatoprost [From Lumigan] Allergy (Mild, Verified 07/16/17 09:03)


   IRRITATION


brimonidine tartrate [From Alphagan] Allergy (Mild, Verified 07/16/17 09:03)


   RASH, IRRITATION


niacin Adverse Reaction (Verified 07/16/17 09:03)


   











- Diet/Activity


Discharge Diet: Cardiac





Physical Exam


Vital Signs: 


                                        











Temp Pulse Resp BP Pulse Ox


 


 97.4 F   118 H  16   118/70   96 


 


 01/12/19 07:45  01/12/19 07:45  01/12/19 07:45  01/12/19 07:45  01/12/19 07:45








                                 Intake & Output











 01/11/19 01/12/19 01/13/19





 06:59 06:59 06:59


 


Intake Total  1220 604


 


Balance  1220 604


 


Weight  73.8 kg 











General appearance: PRESENT: mild distress


Head exam: PRESENT: atraumatic


Eye exam: PRESENT: PERRLA


Mouth exam: PRESENT: moist


Neck exam: ABSENT: carotid bruit, JVD, lymphadenopathy, thyromegaly


Cardiovascular exam: PRESENT: irregular rhythm, systolic murmur, tachycardia


Musculoskeletal exam: PRESENT: ambulatory


Neurological exam: PRESENT: alert, awake, oriented to person, oriented to place,

oriented to time, oriented to situation, CN II-XII grossly intact.  ABSENT: 

motor sensory deficit


Psychiatric exam: PRESENT: appropriate affect, normal mood.  ABSENT: homicidal 

ideation, suicidal ideation





Results


Laboratory Results: 


                                        





                                 01/12/19 04:25 





                                 01/12/19 04:25 





                                        











  01/12/19 01/12/19





  04:25 04:25


 


WBC   6.0


 


RBC   4.16


 


Hgb   11.9 L


 


Hct   35.7 L


 


MCV   86


 


MCH   28.6


 


MCHC   33.4


 


RDW   14.0


 


Plt Count   117 L


 


Seg Neutrophils %   55.4


 


Lymphocytes %   29.6


 


Monocytes %   11.8


 


Eosinophils %   2.7


 


Basophils %   0.5


 


Absolute Neutrophils   3.3


 


Absolute Lymphocytes   1.8


 


Absolute Monocytes   0.7


 


Absolute Eosinophils   0.2


 


Absolute Basophils   0.0


 


Sodium  140.0 


 


Potassium  4.3 


 


Chloride  110 H 


 


Carbon Dioxide  23 


 


Anion Gap  7 


 


BUN  19 


 


Creatinine  0.66 


 


Est GFR ( Amer)  > 60 


 


Est GFR (Non-Af Amer)  > 60 


 


Glucose  113 H 


 


Calcium  8.5 


 


Total Bilirubin  0.3 


 


AST  19 


 


ALT  28 


 


Alkaline Phosphatase  42 


 


Total Protein  5.8 L 


 


Albumin  3.3 L 


 


Triglycerides  191 H 


 


Cholesterol  173.50 


 


LDL Cholesterol Direct  100 


 


VLDL Cholesterol  38.2 H 


 


HDL Cholesterol  29 L 








                                        











  01/11/19 01/11/19 01/11/19





  09:50 09:50 14:17


 


Creatine Kinase  83  


 


CK-MB (CK-2)   1.59 


 


Troponin I   < 0.012  < 0.012


 


NT-Pro-B Natriuret Pep   














  01/11/19 01/11/19 01/11/19





  14:17 14:17 14:17


 


Creatine Kinase   61 


 


CK-MB (CK-2)    1.36


 


Troponin I    Cancelled


 


NT-Pro-B Natriuret Pep  511 H  














  01/11/19 01/11/19 01/12/19





  19:56 19:56 04:25


 


Creatine Kinase  51   62


 


CK-MB (CK-2)   1.23 


 


Troponin I   < 0.012 


 


NT-Pro-B Natriuret Pep   














  01/12/19





  04:25


 


Creatine Kinase 


 


CK-MB (CK-2)  1.04


 


Troponin I  < 0.012


 


NT-Pro-B Natriuret Pep  1690 H











Impressions: 


                                        





Head CT  01/11/19 00:00


IMPRESSION:  MILD CHRONIC CHANGES OF ATROPHY AND MICROVASCULAR ISCHEMIA.  NO 

ACUTE PROCESS.


EVIDENCE OF ACUTE STROKE: NO.


 








Chest X-Ray  01/11/19 10:31


IMPRESSION:  NO ACUTE RADIOGRAPHIC FINDING IN THE CHEST.


 








Chest/Abdomen CTA  01/12/19 00:00


IMPRESSION:  No pulmonary emboli.

## 2019-01-12 NOTE — RADIOLOGY REPORT (SQ)
EXAM DESCRIPTION:  CTA CHEST



COMPLETED DATE/TIME:  1/12/2019 9:39 am



REASON FOR STUDY:  shortness of breath



COMPARISON:  None.



TECHNIQUE:  CT scan of the chest performed using helical scanning technique with dynamic intravenous 
contrast injection.  Images reviewed with lung, soft tissue and bone windows.  Reconstructed coronal 
and sagittal MPR images reviewed.

Additional 3 dimensional post-processing performed to develop Maximal Intensity Projection images (MI
P).  All images stored on PACS.

All CT scanners at this facility use dose modulation, iterative reconstruction, and/or weight based d
osing when appropriate to reduce radiation dose to as low as reasonably achievable (ALARA).

[facility]: Dose Right  [facility]: CareDose    [facility]: Dose Right    [facility]: Teradose 4D    [facility]: Smart Technologies



CONTRAST TYPE AND DOSE:  contrast/concentration: Isovue 350.00 mg/ml; Total Contrast Delivered: 71.0 
ml; Total Saline Delivered: 80.0 ml

Contrast bolus optimized for the pulmonary arteries. Not diagnostic for the aorta.



RENAL FUNCTION:  GFR > 60.



RADIATION DOSE:  CT Rad equipment meets quality standard of care and radiation dose reduction techniq
ues were employed. CTDIvol: 20.0 - 39.7 mGy. DLP: 759 mGy-cm. .



LIMITATIONS:  None.



FINDINGS:  LUNGS AND PLEURA: No masses, infiltrates, or pneumothorax.  No pleural effusions or pleura
l calcifications.

AORTA AND GREAT VESSELS: No aneurysm.  Contrast bolus not optimized for the aorta.

HEART: No pericardial effusion. No significant coronary artery calcifications.

PULMONARY ARTERIES: No emboli visualized in the main pulmonary arteries or the segmental branches.

HILAR AND MEDIASTINAL STRUCTURES: No identified masses or abnormal nodes.

HARDWARE: None in the chest.

UPPER ABDOMEN: No significant findings.  Limited exam.

THYROID AND OTHER SOFT TISSUES: No masses.  No adenopathy.

BONES: No acute or significant finding.

3D MIPS: Confirm above findings.

OTHER: No other significant finding.



IMPRESSION:  No pulmonary emboli.



COMMENT:  Quality ID # [id number]: Final reports with documentation of one or more dose reduction techniques
 (e.g., Automated exposure control, adjustment of the mA and/or kV according to patient size, use of 
iterative reconstruction technique)



TECHNICAL DOCUMENTATION:  JOB ID:  [id number]

 2011 [employer]- All Rights Reserved



Reading location - IP/workstation name: [email or web address]

## 2019-01-12 NOTE — PDOC CONSULTATION
Consultation-Blank


Consultation: 





CARDIOLOGY CONSULTATION by Dr. Antonieta Arenas on 1/12/2019.  The patient was

seen at 10 AM on 1/12/2019.





REASON FOR CONSULTATION: Dyspnea on exertion, cardiac arrhythmia, and 

tachycardia.





HISTORY OF PRESENT ILLNESS: Patient is a 78-year old  female with known

history of hypertension and hyperlipidemia, who states since a few months has 

been having exertional shortness of breath.  The daughter states that if she 

walks to the mailbox which is 125 yards the patient becomes pale diaphoretic and

severely short of breath and does seem to be panting.  The patient also has near

syncopal episodes with exertion.  She also has a hot sensation and tightness in 

her head and also feels that heaviness in the chest as someone sitting on her 

chest with exertion along with shortness of breath.  She has no PND orthopnea or

palpitations.  Patient emergency room was seen to have a short run of 

nonsustained wide-complex tachycardia which is regular, most likely atrial 

fibrillation with aberration episode.  She is here on the monitor shows one 

episode of atrial fibrillation with a ventricular response of 120-140, and 

junctional tachycardia the fastest of which is about 148 bpm.  Subsequently her 

resting EKG shows second-degree AV block with 2 is 2 1 AV block, most likely 

Mobitz type II, with prolonged MO interval.  Although the patient is near 

syncope there is no definite syncope.  There is no TIA CVA symptoms.  There is 

no leg edema.





PAST MEDICAL HISTORY: The patient has a history of hypertension.  There is no 

prior history of coronary artery disease, MI or anginal symptoms.  She has no 

anginal symptoms.  She states that she was told she had a heart murmur.  She 

also has history of hyperlipidemia.  She dyspnea on exertion as mentioned in 

history of present illness.  Near syncopal episodes without syncope.  She has no

history of diabetes mellitus or thyroid disease.  There is no history of TIA 

CVA.  There is no history of chronic kidney disease.  There is no history of 

congestive heart failure.  There is no history of TIAs or CVA.  No history of 

headaches migraines or seizures.  She has no history of asthma or COPD.  She in 

the 90s had a severe GI bleed, and subsequently ended up having a total 

colectomy.  She said states that last year she had a cold positive stools.  As 

per the daughter recently she has been having some dark black stools.  She has 

no abdominal pain.





PAST SURGICAL HISTORY: She has had 2 C-sections, and also has had total 

colectomy for a GI bleed of unknown origin.  She also had corrective surgery in 

the in the form of removal of bony spur in her right leg.





Allergies: She Is Allergic to Bimatoprost,  Brimonidine, and niacin.





FAMILY HISTORY: Is positive for cancer.  There is no history of CAD or diabetes 

or hypertension.





DISPOSITION: The patient is a full code.  Her daughter is her surrogate 

healthcare decision makers.





REVIEW OF SYSTEMS: Constitutional: Denies fever chills or rigors.  Complains of 

generalized fatigue and generalized weakness.  HEAD: History of tightness and 

warm sensation in his chest with exertion.  History of dizziness present.  No 

history of head trauma.  EYES: NO HISTORY OF AMBLYOPIA DIPLOPIA NO HISTORY OF 

AMAUROSIS FUGAX.  Ears: No history of tinnitus.  No history of vertigo.  No 

history of recurrent ear infections.  No history of hearing loss.  NOSE: No 

history of hayfever.  No history of nosebleeds.  No history of nasal polyps.  

MOUTH: No history of altered taste sensation.  No history of ulcers in the 

mouth.,  And no bleeding from the gums.  THROAT: No history of odynophagia or 

dysphagia.  No history of recurrent sore throats.  SKIN: No history of pruritus.

 No history of yellowish discoloration of the skin.  No history of psoriasis.  

No history of skin cancer.  NECK: No symptoms of C-spine arthritis.  No goiter. 

No swelling in the neck.  LUNGS: No history of asthma or COPD.  No history of 

sleep apnea no history of pulmonary embolism.  No history of pleuritic chest 

pain.  No history of hemoptysis.  No wheezing.  No symptoms suggestive of upper 

or lower respiratory tract infections. HEART: History of hypertension present 

history of hyperlipidemia present.  No history of coronary artery disease or MI 

or anginal symptoms.  No leg edema.  No history of PND orthopnea.  Near syncope 

with exertion b but no history of angel syncope.  ENDOCRINE: She has no history 

of diabetes mellitus no history of thyroid disease.  RENAL: No history of 

chronic kidney disease.  CNS: No history of TIA CVA.  No history of headaches 

migraines or seizures.   PSYCHIATRIC: No history of anxiety or depression.  No 

suicidal ideation.  No homicidal ideation.  GI: History of GERD present.  No 

history of fatty food intolerance.  Past history of GI bleed requiring total 

colectomy.  Denies abdominal pain.  History of black tarry stools recently.  No 

abdominal pain.  No history of jaundice.  No history of ascites.  No history of 

cirrhosis of the liver.  MUSCULOSKELETAL: No history of collagen vascular 

disease.  No history of arthritis.  VASCULAR: No history of calf or buttock 

claudication.  No history of DVT.  HEMATOLOGICAL: No history of bleeding 

diathesis.  No history of clotting disorders.





Physical EXAMINATION: The patient is mildly obese.  At present in no acute 

distress.  She is well-groomed.


                                                                Selected Entries











  01/12/19





  07:45


 


Temperature 97.4 F


 


Temperature Oral





Source 


 


Pulse Rate 118 H


 


Respiratory 16





Rate 


 


Blood Pressure 118/70


 


Blood Pressure 86





Mean 


 


BP Location Left Arm


 


BP Position Supine


 


O2 Sat by Pulse 96





Oximetry 


 


Oxygen Delivery Room Air





Method 





                                                                                











  01/11/19 01/11/19 01/11/19





  09:50 09:50 09:50


 


WBC  7.1  


 


RBC  4.87  


 


Hgb  13.8  


 


Hct  41.7  


 


MCV  86  


 


MCH  28.3  


 


MCHC  33.0  


 


RDW  13.8  


 


Plt Count  130 L  


 


Sodium   


 


Potassium   


 


Chloride   


 


Carbon Dioxide   


 


Anion Gap   


 


BUN   


 


Creatinine   


 


Est GFR (Non-Af Amer)   


 


Glucose   


 


Calcium   10.1 


 


Total Bilirubin   0.5 


 


Direct Bilirubin   0.1 


 


Neonat Total Bilirubin   Not Reportable 


 


Neonat Direct Bilirubin   Not Reportable 


 


Neonat Indirect Bili   Not Reportable 


 


AST   24 


 


ALT   19 


 


Alkaline Phosphatase   50 


 


Creatine Kinase   83 


 


CK-MB (CK-2)    1.59


 


Troponin I    < 0.012


 


NT-Pro-B Natriuret Pep   


 


Total Protein   7.5 


 


Albumin   4.6 


 


Triglycerides   


 


Cholesterol   


 


LDL Cholesterol Direct   


 


VLDL Cholesterol   


 


HDL Cholesterol   





HEAD: Is atraumatic normocephalic cephalic.  EYES: Pupils equal round regular 

reactive to light and accommodation.  Extraocular movements are normal.  There 

is no clinical pallor.  There is no scleral icterus.  EARS: Tympanic membranes 

are intact.  External auditory canals are clear.  NOSE: No deviated nasal 

septum.  No inflammation of the nasal mucous membrane.  MOUTH: Mucous membranes 

of mouth are moist.  Tongue is moist.  There is no ulcers.  There is no bleeding

from the gums.  THROAT: There is no redness of the oropharynx.  No exudates.  

NECK: Neck is supple.  There is no JVD.  Carotids are equal with transmitted 

murmur from the aortic area.  There is no significant carotid delay.  There is 

no lymphadenopathy.  There is no goiter.  Trachea central.  LUNGS: Is clear to 

auscultation percussion, without any rhonchi rales or wheezing.  HEART: S1-S2 is

heard there is no S3 gallop there is no S4 gallop there is systolic murmur of 

mild aortic stenosis present 82 is preserved.  There is no significant carotid 

delay.  There is no thrill.  There is no S3-S4 gallops.  There is no rub.








  01/11/19 01/11/19 01/11/19





  14:17 14:17 14:17


 


WBC   


 


RBC   


 


Hgb   


 


Hct   


 


MCV   


 


MCH   


 


MCHC   


 


RDW   


 


Plt Count   


 


Sodium   


 


Potassium   


 


Chloride   


 


Carbon Dioxide   


 


Anion Gap   


 


BUN   


 


Creatinine   


 


Est GFR (Non-Af Amer)   


 


Glucose   


 


Calcium   


 


Total Bilirubin   


 


Direct Bilirubin   


 


Neonat Total Bilirubin   


 


Neonat Direct Bilirubin   


 


Neonat Indirect Bili   


 


AST   


 


ALT   


 


Alkaline Phosphatase   


 


Creatine Kinase    61


 


CK-MB (CK-2)   


 


Troponin I  < 0.012  


 


NT-Pro-B Natriuret Pep   511 H 


 


Total Protein   


 


Albumin   


 


Triglycerides   


 


Cholesterol   


 


LDL Cholesterol Direct   


 


VLDL Cholesterol   


 


HDL Cholesterol   














  01/11/19 01/11/19 01/11/19





  14:17 19:56 19:56


 


WBC   


 


RBC   


 


Hgb   


 


Hct   


 


MCV   


 


MCH   


 


MCHC   


 


RDW   


 


Plt Count   


 


Sodium   


 


Potassium   


 


Chloride   


 


Carbon Dioxide   


 


Anion Gap   


 


BUN   


 


Creatinine   


 


Est GFR (Non-Af Amer)   


 


Glucose   


 


Calcium   


 


Total Bilirubin   


 


Direct Bilirubin   


 


Neonat Total Bilirubin   


 


Neonat Direct Bilirubin   


 


Neonat Indirect Bili   


 


AST   


 


ALT   


 


Alkaline Phosphatase   


 


Creatine Kinase   51 


 


CK-MB (CK-2)  1.36   1.23


 


Troponin I    < 0.012


 


NT-Pro-B Natriuret Pep   


 


Total Protein   


 


Albumin   


 


Triglycerides   


 


Cholesterol   


 


LDL Cholesterol Direct   


 


VLDL Cholesterol   


 


HDL Cholesterol   














  01/12/19 01/12/19 01/12/19





  04:25 04:25 04:25


 


WBC    6.0


 


RBC    4.16


 


Hgb    11.9 L


 


Hct    35.7 L


 


MCV    86


 


MCH    28.6


 


MCHC    33.4


 


RDW    14.0


 


Plt Count    117 L


 


Sodium  140.0  


 


Potassium  4.3  


 


Chloride  110 H  


 


Carbon Dioxide  23  


 


Anion Gap  7  


 


BUN  19  


 


Creatinine  0.66  


 


Est GFR (Non-Af Amer)  > 60  


 


Glucose  113 H  


 


Calcium  8.5  


 


Total Bilirubin  0.3  


 


Direct Bilirubin  0.1  


 


Neonat Total Bilirubin  Not Reportable  


 


Neonat Direct Bilirubin  Not Reportable  


 


Neonat Indirect Bili  Not Reportable  


 


AST  19  


 


ALT  28  


 


Alkaline Phosphatase  42  


 


Creatine Kinase  62  


 


CK-MB (CK-2)   1.04 


 


Troponin I   < 0.012 


 


NT-Pro-B Natriuret Pep   1690 H 


 


Total Protein  5.8 L  


 


Albumin  3.3 L  


 


Triglycerides  191 H  


 


Cholesterol  173.50  


 


LDL Cholesterol Direct  100  


 


VLDL Cholesterol  38.2 H  


 


HDL Cholesterol  29 L  





Home Meds Table





Ergocalciferol (Vitamin D2) [Drisdol 50,000 unit (1.25MG) Capsule]   50,000 unit

PO ALVA@1000 01/11/19 


Esomeprazole Magnesium [Nexium 24Hr]   20 mg PO BID 01/11/19 


Fenofibrate Nanocrystallized [Tricor 48 mg Tablet]   48 mg PO QHS 01/11/19 


Hydrochlorothiazide [Hydrodiuril 12.5 mg Tablet]   12.5 mg PO DAILY 01/11/19 


Loratadine [Allergy]   10 mg PO DAILY 01/11/19 


Metoprolol Succinate [Toprol Xl 25 mg Tab.sr]   25 mg PO DAILY 01/11/19 


Pramipexole Di-HCl [Pramipexole Dihydrochloride]   1 mg PO QHS 01/11/19 01/11/19 10:31


Aspirin [Aspirin 81 mg Chewable Tablet]   324 mg PO NOW ONE 





01/11/19 10:32


Normal Saline 1000 ml [NaCl 0.9% 1000 ml IV Soln] 1,000 ml IV BOLUS 





01/11/19 14:50


Metoprolol Tartrate [Lopressor 25 mg Tablet]   25 mg PO NOW ONE 





01/11/19 15:53


Normal Saline 1000 ml [NaCl 0.9% 1000 ml IV Soln] 1,000 ml IV CONTINUOUS 





01/11/19 15:58


Metoprolol Tartrate [Lopressor Inj/Pf 5 mg/5 ml Sdv]   5 mg IV PRN PRN 





01/11/19 17:00


Enoxaparin Sodium [Lovenox Inj 40 mg/0.4 ml Disp.syrin]   40 mg SUBCUT DAILY 





01/11/19 22:00


Metoprolol Succinate [Toprol Xl 50 mg Tab.sr]   50 mg PO QHS 


Normal Saline [Saline Flush 2.5 ml Monoject Prefil Syrin]   2.5 ml IV Q8 


Pramipexole Di-HCl [Mirapex 0.5 mg Tablet]   0.5 mg PO QHS 





01/12/19 06:00


Lansoprazole [Prevacid 30 mg Odt Tablet]   30 mg PO Q6AM 





01/12/19 08:00


Cholecalciferol (Vitamin D3) [Vitamin D3 1000 Unit Tablet]   5,000 unit PO 

[id number] 





01/12/19 08:08


Digoxin Inj [Lanoxin Inj 0.5 mg/2 ml Ampule]   0.25 mg IV NOW ONE 





01/12/19 10:00


Aspirin [Aspirin 81 mg Chewable Tablet]   81 mg PO DAILY 


Atorvastatin Calcium [Lipitor 40 mg Tablet]   40 mg PO QHS 


Loratadine [Claritin 10 mg Tablet]   10 mg PO DAILY 


Metoprolol Succinate [Toprol Xl 25 mg Tab.sr]   25 mg PO DAILY 





01/12/19 22:00


Fenofibrate Nanocrystallized [Tricor 48 mg Tablet]   48 mg PO QHS 


Pramipexole Di-HCl [Mirapex 0.5 mg Tablet]   1 mg PO QHS 





01/13/19 10:00


Ergocalciferol (Vitamin D2) [Drisdol 50,000 Unit (1.25MG) Capsule]   50,000 unit

PO ALVA@1000 





The patient's chest x-ray is negative for any acute process.  A pulmonary CT 

angiogram shows no evidence of pulmonary emboli.  There is no significant 

pathology seen.  EKG on admission so shows junctional tachycardia.  Diffuse 

nonspecific ST-T changes minor.  The patient EKG today shows 1 shows sinus 

rhythm.  There is secondary AV block with 2-1 conduction.  Most likely the 

patient has Mobitz type II AV block.


The patient's echocardiogram: Shows normal left ventricular size, without LVH.  

With no wall motion abnormality, and normal ejection fraction.  It also shows 

mild aortic stenosis with a peak gradient of 23 mmHg.  [Please see report].





IMPRESSION/Recommendation:





1.  Dyspnea on exertion: With associated symptoms of chest heaviness on the left

front of the chest, and near syncope.  Most likely secondary to patient's card

iac arrhythmia.





2.  Cardiac arrhythmia, with the patient's monitor strip showing junctional 

tachycardia rate of 149 bpm, one episode of wide-complex irregular tachycardia 

most likely atrial fibrillation with aberrancy, and episode of atrial 

fibrillation, and sinus rhythm with Mobitz type II AV block, with a prolonged MO

interval, with the 2 is to 1 AV conduction.  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  The patient most likely will need a 

permanent pacemaker placement prior to institution medications to slow down her 

heart rate.  Also would recommend getting IV Lexiscan Cardiolite stress test 

later.





3.  Mild aortic stenosis.  This does not seem to be hemodynamically significant 

at present.





4.  Mild hypertension.  Continue antihypertensive medication.





5.  Hyperlipidemia with low HDL levels, and elevated LDL level, which is 

borderline elevated.





6.  Hyperlipidemia/dyslipidemia.  Continue patient on statin.





MEDICATION reviewed.  Medications adjusted.  Medical decision making is of high 

complexity.  Note 60 minutes spent on this patient more than 50% of time spent 

in direct patient care.  Note would strongly recommend that the patient be 

transferred to tertiary care CENTER since the patient most likely will need a 

permanent pacemaker placement.  The patient then can be started on medication to

keep her in sinus mechanism.  This has been discussed the patient patient and 

family.


Discussed with Dr. Vasquez, who is on-call for [facility], in 

[facility] [place].  The case has been discussed with him in depth, and 

he agrees to accept the patient.  Will arrange transfer via the transfer center 

for [facility].  Discussed with the patient's attending

physician.  Discussed with patient and patient's family including the patient's 

2 daughters.  The risk benefits alternative therapeutic options and risks and 

complications of transfer to [facility] via is an 

ambulance, were discussed in detail with the patient's family.  [2 sisters].

## 2019-01-12 NOTE — EKG REPORT
SEVERITY:- ABNORMAL ECG -

JUNCTIONAL  TACHYCARDIA

NONSPECIFIC T ABNORMALITIES, LATERAL LEADS

:

Confirmed by: Franklin Middleton MD 12-Jan-2019 09:18:21

## 2019-01-18 NOTE — XCELERA REPORT
[address] [address]

                               Tel: [phone]

                               Fax: [phone]



                      Lower Extremity Arterial Evaluation

_______________________________________________________________________________



Name: JESUS RODRIGUEZ

MRN: [id number]                                    Age: 78 yrs

Gender: Female                                     : 1940

Patient Status: Inpatient                          Patient Location:

[address] [place], [place] [address]

Account #: [id number]

Study Date: 2019 06:26 PM

                           Accession #: [id number]

_______________________________________________________________________________

Procedure: A color flow and duplex scan of the lower extremity arteries was

performed bilaterally with velocity and waveform anaylsis.

Reason For Study: claudication pain







Ordering Physician: NINA DIXON

Performed By: Rossana Garcia

_______________________________________________________________________________

_______________________________________________________________________________





Measurements and Calculations



                                     Right  Left

                     CFA PSV         68.3   81.3 cm/sec

                     Prox PFA PSV           57.8 cm/sec

                     Prox SFA PSV    53.4   76.6 cm/sec

                     Mid SFA PSV     60.7   69.5 cm/sec

                     Dist SFA PSV    52.5   57.0 cm/sec

                     Dist Pop A PSV  60.3   51.9 cm/sec

                     Mid CHARLI PSV     60.3   79.8 cm/sec

                     Mid PTA PSV     94.7   59.4 cm/sec

                     Law Pedis PSV   -32.2 -50.3 cm/sec



_______________________________________________________________________________

Right Side Arterial Evaluation

Normal velocity and triphasic waveforms noted from the Common Femoral artery

to the infrageniculate vessels .

Ankle Brachial index not obtained .



Left Side Arterial Evaluation

Normal velocity and triphasic waveforms noted from the Common Femoral artery

to the infrageniculate vessels

Ankle Brachial index not obtained.



_______________________________________________________________________________

Interpretation Summary

No hemodynamically significant lesions in the bilateral lower extremities, on

duplex imaging, at rest.

_______________________________________________________________________________

Electronically signed by:      Lennox Williams      on 2019 11:50 AM



CC: NINA DIXON

>

Williams, Lennox

## 2019-09-14 ENCOUNTER — HOSPITAL ENCOUNTER (OUTPATIENT)
Dept: HOSPITAL 62 - ER | Age: 79
Setting detail: OBSERVATION
LOS: 2 days | Discharge: HOME | End: 2019-09-16
Attending: STUDENT IN AN ORGANIZED HEALTH CARE EDUCATION/TRAINING PROGRAM | Admitting: STUDENT IN AN ORGANIZED HEALTH CARE EDUCATION/TRAINING PROGRAM
Payer: MEDICARE
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
## 2019-09-14 DIAGNOSIS — S00.211A: ICD-10-CM

## 2019-09-14 DIAGNOSIS — Z45.018: ICD-10-CM

## 2019-09-14 DIAGNOSIS — M79.631: ICD-10-CM

## 2019-09-14 DIAGNOSIS — R55: Primary | ICD-10-CM

## 2019-09-14 DIAGNOSIS — G89.29: ICD-10-CM

## 2019-09-14 DIAGNOSIS — Z79.899: ICD-10-CM

## 2019-09-14 DIAGNOSIS — Z90.49: ICD-10-CM

## 2019-09-14 DIAGNOSIS — M54.2: ICD-10-CM

## 2019-09-14 DIAGNOSIS — S51.811A: ICD-10-CM

## 2019-09-14 DIAGNOSIS — M54.9: ICD-10-CM

## 2019-09-14 DIAGNOSIS — E78.5: ICD-10-CM

## 2019-09-14 DIAGNOSIS — S80.211A: ICD-10-CM

## 2019-09-14 DIAGNOSIS — I10: ICD-10-CM

## 2019-09-14 DIAGNOSIS — S00.03XA: ICD-10-CM

## 2019-09-14 DIAGNOSIS — M13.89: ICD-10-CM
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
## 2019-09-14 DIAGNOSIS — Z85.038: ICD-10-CM

## 2019-09-14 DIAGNOSIS — S80.212A: ICD-10-CM
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
## 2019-09-14 LAB
ADD MANUAL DIFF: NO
ALBUMIN SERPL-MCNC: 3.6 G/DL (ref 3.5–5)
ALP SERPL-CCNC: 47 U/L (ref 38–126)
ANION GAP SERPL CALC-SCNC: 8 MMOL/L (ref 5–19)
APPEARANCE UR: CLEAR
APTT PPP: YELLOW S
AST SERPL-CCNC: 27 U/L (ref 14–36)
BASOPHILS # BLD AUTO: 0 10^3/UL (ref 0–0.2)
BASOPHILS NFR BLD AUTO: 0.5 % (ref 0–2)
BILIRUB DIRECT SERPL-MCNC: 0.2 MG/DL (ref 0–0.4)
BILIRUB SERPL-MCNC: 0.3 MG/DL (ref 0.2–1.3)
BILIRUB UR QL STRIP: NEGATIVE
BUN SERPL-MCNC: 21 MG/DL (ref 7–20)
CALCIUM: 9.2 MG/DL (ref 8.4–10.2)
CHLORIDE SERPL-SCNC: 104 MMOL/L (ref 98–107)
CO2 SERPL-SCNC: 28 MMOL/L (ref 22–30)
EOSINOPHIL # BLD AUTO: 0.2 10^3/UL (ref 0–0.6)
EOSINOPHIL NFR BLD AUTO: 2.7 % (ref 0–6)
ERYTHROCYTE [DISTWIDTH] IN BLOOD BY AUTOMATED COUNT: 15.4 % (ref 11.5–14)
GLUCOSE SERPL-MCNC: 108 MG/DL (ref 75–110)
GLUCOSE UR STRIP-MCNC: NEGATIVE MG/DL
HCT VFR BLD CALC: 36.3 % (ref 36–47)
HGB BLD-MCNC: 12 G/DL (ref 12–15.5)
KETONES UR STRIP-MCNC: NEGATIVE MG/DL
LYMPHOCYTES # BLD AUTO: 1.1 10^3/UL (ref 0.5–4.7)
LYMPHOCYTES NFR BLD AUTO: 14 % (ref 13–45)
MCH RBC QN AUTO: 28.3 PG (ref 27–33.4)
MCHC RBC AUTO-ENTMCNC: 33 G/DL (ref 32–36)
MCV RBC AUTO: 86 FL (ref 80–97)
MONOCYTES # BLD AUTO: 0.6 10^3/UL (ref 0.1–1.4)
MONOCYTES NFR BLD AUTO: 8.2 % (ref 3–13)
NEUTROPHILS # BLD AUTO: 5.7 10^3/UL (ref 1.7–8.2)
NEUTS SEG NFR BLD AUTO: 74.6 % (ref 42–78)
NITRITE UR QL STRIP: NEGATIVE
PH UR STRIP: 5 [PH] (ref 5–9)
PLATELET # BLD: 198 10^3/UL (ref 150–450)
POTASSIUM SERPL-SCNC: 4.4 MMOL/L (ref 3.6–5)
PROT SERPL-MCNC: 6.6 G/DL (ref 6.3–8.2)
PROT UR STRIP-MCNC: NEGATIVE MG/DL
RBC # BLD AUTO: 4.23 10^6/UL (ref 3.72–5.28)
SP GR UR STRIP: 1.02
TOTAL CELLS COUNTED % (AUTO): 100 %
UROBILINOGEN UR-MCNC: NEGATIVE MG/DL (ref ?–2)
WBC # BLD AUTO: 7.6 10^3/UL (ref 4–10.5)

## 2019-09-14 PROCEDURE — 70486 CT MAXILLOFACIAL W/O DYE: CPT

## 2019-09-14 PROCEDURE — 96361 HYDRATE IV INFUSION ADD-ON: CPT

## 2019-09-14 PROCEDURE — 83735 ASSAY OF MAGNESIUM: CPT

## 2019-09-14 PROCEDURE — 85025 COMPLETE CBC W/AUTO DIFF WBC: CPT

## 2019-09-14 PROCEDURE — 72125 CT NECK SPINE W/O DYE: CPT

## 2019-09-14 PROCEDURE — 93010 ELECTROCARDIOGRAM REPORT: CPT

## 2019-09-14 PROCEDURE — 72070 X-RAY EXAM THORAC SPINE 2VWS: CPT

## 2019-09-14 PROCEDURE — 72110 X-RAY EXAM L-2 SPINE 4/>VWS: CPT

## 2019-09-14 PROCEDURE — 36415 COLL VENOUS BLD VENIPUNCTURE: CPT

## 2019-09-14 PROCEDURE — G0378 HOSPITAL OBSERVATION PER HR: HCPCS

## 2019-09-14 PROCEDURE — 84443 ASSAY THYROID STIM HORMONE: CPT

## 2019-09-14 PROCEDURE — 96374 THER/PROPH/DIAG INJ IV PUSH: CPT

## 2019-09-14 PROCEDURE — 93880 EXTRACRANIAL BILAT STUDY: CPT

## 2019-09-14 PROCEDURE — 73090 X-RAY EXAM OF FOREARM: CPT

## 2019-09-14 PROCEDURE — 81001 URINALYSIS AUTO W/SCOPE: CPT

## 2019-09-14 PROCEDURE — 73564 X-RAY EXAM KNEE 4 OR MORE: CPT

## 2019-09-14 PROCEDURE — 99285 EMERGENCY DEPT VISIT HI MDM: CPT

## 2019-09-14 PROCEDURE — 80053 COMPREHEN METABOLIC PANEL: CPT

## 2019-09-14 PROCEDURE — 84484 ASSAY OF TROPONIN QUANT: CPT

## 2019-09-14 PROCEDURE — 93005 ELECTROCARDIOGRAM TRACING: CPT

## 2019-09-14 PROCEDURE — 70450 CT HEAD/BRAIN W/O DYE: CPT

## 2019-09-14 PROCEDURE — 71046 X-RAY EXAM CHEST 2 VIEWS: CPT

## 2019-09-14 RX ADMIN — FENOFIBRATE SCH: 48 TABLET ORAL at 21:49

## 2019-09-14 RX ADMIN — KETOROLAC TROMETHAMINE PRN MG: 30 INJECTION, SOLUTION INTRAMUSCULAR at 21:27

## 2019-09-14 RX ADMIN — SODIUM CHLORIDE PRN MLS/HR: 9 INJECTION, SOLUTION INTRAVENOUS at 21:38

## 2019-09-14 RX ADMIN — PANTOPRAZOLE SODIUM SCH MG: 20 TABLET, DELAYED RELEASE ORAL at 21:30

## 2019-09-14 RX ADMIN — Medication SCH: at 21:39

## 2019-09-14 RX ADMIN — FERROUS SULFATE TAB 325 MG (65 MG ELEMENTAL FE) SCH MG: 325 (65 FE) TAB at 21:29

## 2019-09-14 RX ADMIN — HEPARIN SODIUM SCH UNIT: 5000 INJECTION, SOLUTION INTRAVENOUS; SUBCUTANEOUS at 21:39

## 2019-09-14 RX ADMIN — Medication SCH: at 14:00

## 2019-09-14 RX ADMIN — METOPROLOL SUCCINATE SCH MG: 25 TABLET, EXTENDED RELEASE ORAL at 21:48

## 2019-09-14 NOTE — ER DOCUMENT REPORT
ED Syncope and Near Syncope





- General


Chief Complaint: Fall Injury


Stated Complaint: FALL/BODY PAIN


Time Seen by Provider: 19 08:34


Information source: Patient


Notes: 





Patient states that she was walking outside this morning to her garden shed and 

then woke up on the ground.  Patient states that she does not feel that she had 

been unconscious for a prolonged period of time.  Patient with abrasion and 

bruising to right brow area and bruising and swelling to right forearm.  Patient

complains of some abrasions to bilateral knees.  Patient states that prior to 

this syncopal episode she felt fine and that she was not having any symptoms.  

Patient denies any chest pain shortness of breath nausea or vomiting.  Patient 

states she does have neck and back pain but she has chronic neck and back pain 

and this feels typical for her chronic pain.  Patient denies any recent changes 

in her medications.


TRAVEL OUTSIDE OF THE [place] IN LAST 30 DAYS: No





- HPI


Patient complains to provider of: Fainting


Episode witnessed (by whom): No


Symptoms prior to episode: None.  No: Chest pain, Palpitations, Short of breath


Position/Activity at time of episode: Activity - Walking


Quality of pain: Achy


Pain Level: 2


Context: Lost consciousness


Injury location: Back, Face, RUE


Current symptoms: Arm pain, Back pain, Neck pain.  denies: Breathing difficulty,

Chest pain, Dizziness, Fever


Similar symptoms previously: No


Recently seen / treated by doctor: No





- Related Data


Allergies/Adverse Reactions: 


                                        





bimatoprost [From Lumigan] Allergy (Mild, Verified 17 09:03)


   IRRITATION


brimonidine tartrate [From Alphagan] Allergy (Mild, Verified 17 09:03)


   RASH, IRRITATION


niacin Adverse Reaction (Verified 17 09:03)


   











Past Medical History





- General


Information source: Patient, Relative





- Social History


Smoking Status: Never Smoker


Chew tobacco use (# tins/day): No


Frequency of alcohol use: None


Drug Abuse: None


Lives with: Alone


Family History: Reviewed & Not Pertinent


Patient has suicidal ideation: No


Patient has homicidal ideation: No





- Past Medical History


Cardiac Medical History: Reports: Hx Hypercholesterolemia, Hx Hypertension


Neurological Medical History: Denies: Hx Cerebrovascular Accident, Hx Seizures


Renal/ Medical History: Denies: Hx Peritoneal Dialysis


GI Medical History: Reports: Hx Gastroesophageal Reflux Disease.  Denies: Hx 

Hepatitis, Hx Hiatal Hernia, Hx Ulcer


Musculoskeletal Medical History: Reports Hx Arthritis - GENERALIZED


Psychiatric Medical History: 


   Denies: Hx Depression


Infectious Medical History: Denies: Hx Hepatitis


Past Surgical History: Reports: Hx Appendectomy, Hx Bowel Surgery, Hx  

Section, Other - Near complete colon resection





- Immunizations


Hx Diphtheria, Pertussis, Tetanus Vaccination: Yes


Hx Pneumococcal Vaccination: 01





Review of Systems





- Review of Systems


Constitutional: No symptoms reported.  denies: Fever, Weakness, Recent illness


EENT: No symptoms reported


Cardiovascular: Syncope.  denies: Chest pain, Dizziness, Lightheaded


Respiratory: No symptoms reported.  denies: Cough, Short of breath


Gastrointestinal: No symptoms reported.  denies: Abdominal pain, Nausea, 

Vomiting


Genitourinary: No symptoms reported.  denies: Dysuria


Female Genitourinary: No symptoms reported


Musculoskeletal: Back pain, Neck pain, Other - Right forearm pain


Skin: Other - Bruising to right forearm


Hematologic/Lymphatic: No symptoms reported


Neurological/Psychological: Lost consciousness.  denies: Headaches





Physical Exam





- Vital signs


Vitals: 


                                        











Temp Pulse Resp BP Pulse Ox


 


 97.5 F   91   16   140/89 H  96 


 


 19 08:25  19 08:25  19 08:25  19 08:25  19 08:25














- General


General appearance: Appears well, Alert


In distress: None





- HEENT


Head: Abrasions - Right periorbital abrasion with tenderness and ecchymosis, 

Ecchymosis, Tenderness.  No: Guadarrama's sign, Racoon's eyes


Eyes: Normal


Conjunctiva: Normal


Extraocular movements intact: Yes


Eyelashes: Normal


Pupils: PERRL


Ears: Normal


External canal: Normal


Tympanic membrane: Normal.  No: Hemotympanum


Nasal: Normal


Mouth/Lips: Normal


Mucous membranes: Normal


Neck: Supple, Other - Posterior cervical midline tenderness, no step-off or 

deformity.  No: Lymphadenopathy





- Respiratory


Respiratory status: No respiratory distress


Chest status: Nontender


Breath sounds: Normal.  No: Rales, Rhonchi, Stridor, Wheezing


Chest palpation: Normal





- Cardiovascular


Rhythm: Regular


Heart sounds: S1 appreciated, S2 appreciated


Murmur: No


Pulses: Normal: Radial, Posterior tibial





- Abdominal


Inspection: Normal


Distension: No distension


Bowel sounds: Normal


Tenderness: Nontender


Organomegaly: No organomegaly





- Back


Back: Vertebra tenderness - Thoracic midline tenderness T 4-9 area, lower lumbar

tenderness





- Extremities


General upper extremity: Tender - right forearm, Normal ROM


General lower extremity: Normal ROM


Shoulder: Normal, Nontender


Arm: Normal, Nontender


Elbow: Normal, Nontender


Forearm: Tender - Right forearm tenderness midshaft area with hematoma and skin 

tear, Deformity, Ecchymosis


Wrist: Normal, Nontender


Hand: Normal, Nontender


Hip: Normal, Nontender


Thigh: Normal, Nontender


Knee: Tender, Abrasion - Abrasion overlying the anterior aspect of bilateral 

knees.  No: Dislocation, Joint effusion, Laxity with valgus stress, Laxity with 

varus stress, Pain with ROM


Ankle: Normal, Nontender


Foot: Normal, Nontender





- Neurological


Neuro grossly intact: Yes


Cognition: Normal


Jackson Coma Scale Eye Opening: Spontaneous


Jackson Coma Scale Verbal: Oriented


Jewel Coma Scale Motor: Obeys Commands


Jackson Coma Scale Total: 15


Speech: Normal


Cranial nerves: Normal





- Psychological


Associated symptoms: Normal affect, Normal mood





- Skin


Skin Temperature: Warm


Skin Moisture: Dry


Skin Color: Normal


Skin irregularity: Laceration - Right forearm





Course





- Re-evaluation


Re-evalutation: 





19 12:26


Consulted with Dr. Grijalva regarding patient presentation and diagnostic 

evaluation.  Recommends consultation with hospitalist for admission for syncope.

 Consulted with Dr. Woods regarding patient presentation.  Recommends obtaining 

orthostatic vital signs at this time.





- Vital Signs


Vital signs: 


                                        











Temp Pulse Resp BP Pulse Ox


 


 97.6 F   82   18   133/76 H  97 


 


 19 16:00  19 14:57  19 17:01  19 17:00  19 17:01














- Laboratory


Result Diagrams: 


                                 19 09:44





                                 19 09:44


Laboratory results interpreted by me: 


                                        











  19





  09:44 09:44 09:50


 


RDW  15.4 H  


 


BUN   21 H 


 


Ur Leukocyte Esterase    SMALL H











                               Labs- Entire Visit











  19





  09:44 09:44 09:44


 


WBC  7.6  


 


RBC  4.23  


 


Hgb  12.0  


 


Hct  36.3  


 


MCV  86  


 


MCH  28.3  


 


MCHC  33.0  


 


RDW  15.4 H  


 


Plt Count  198  


 


Lymph % (Auto)  14.0  


 


Mono % (Auto)  8.2  


 


Eos % (Auto)  2.7  


 


Baso % (Auto)  0.5  


 


Absolute Neuts (auto)  5.7  


 


Absolute Lymphs (auto)  1.1  


 


Absolute Monos (auto)  0.6  


 


Absolute Eos (auto)  0.2  


 


Absolute Basos (auto)  0.0  


 


Seg Neutrophils %  74.6  


 


Sodium   139.9 


 


Potassium   4.4 


 


Chloride   104 


 


Carbon Dioxide   28 


 


Anion Gap   8 


 


BUN   21 H 


 


Creatinine   0.69 


 


Est GFR ( Amer)   > 60 


 


Est GFR (MDRD) Non-Af   > 60 


 


Glucose   108 


 


Calcium   9.2 


 


Magnesium   1.9 


 


Total Bilirubin   0.3 


 


Direct Bilirubin   0.2 


 


Neonat Total Bilirubin   Not Reportable 


 


Neonat Direct Bilirubin   Not Reportable 


 


Neonat Indirect Bili   Not Reportable 


 


AST   27 


 


ALT   15 


 


Alkaline Phosphatase   47 


 


Troponin I    < 0.012


 


Total Protein   6.6 


 


Albumin   3.6 


 


TSH   


 


Urine Color   


 


Urine Appearance   


 


Urine pH   


 


Ur Specific Gravity   


 


Urine Protein   


 


Urine Glucose (UA)   


 


Urine Ketones   


 


Urine Blood   


 


Urine Nitrite   


 


Urine Bilirubin   


 


Urine Urobilinogen   


 


Ur Leukocyte Esterase   


 


Urine WBC (Auto)   


 


Urine RBC (Auto)   


 


Squamous Epi Cells Auto   


 


Urine Mucus (Auto)   


 


Urine Ascorbic Acid   














  19





  09:44 09:50


 


WBC  


 


RBC  


 


Hgb  


 


Hct  


 


MCV  


 


MCH  


 


MCHC  


 


RDW  


 


Plt Count  


 


Lymph % (Auto)  


 


Mono % (Auto)  


 


Eos % (Auto)  


 


Baso % (Auto)  


 


Absolute Neuts (auto)  


 


Absolute Lymphs (auto)  


 


Absolute Monos (auto)  


 


Absolute Eos (auto)  


 


Absolute Basos (auto)  


 


Seg Neutrophils %  


 


Sodium  


 


Potassium  


 


Chloride  


 


Carbon Dioxide  


 


Anion Gap  


 


BUN  


 


Creatinine  


 


Est GFR ( Amer)  


 


Est GFR (MDRD) Non-Af  


 


Glucose  


 


Calcium  


 


Magnesium  


 


Total Bilirubin  


 


Direct Bilirubin  


 


Neonat Total Bilirubin  


 


Neonat Direct Bilirubin  


 


Neonat Indirect Bili  


 


AST  


 


ALT  


 


Alkaline Phosphatase  


 


Troponin I  


 


Total Protein  


 


Albumin  


 


TSH  2.39 


 


Urine Color   YELLOW


 


Urine Appearance   CLEAR


 


Urine pH   5.0


 


Ur Specific Gravity   1.025


 


Urine Protein   NEGATIVE


 


Urine Glucose (UA)   NEGATIVE


 


Urine Ketones   NEGATIVE


 


Urine Blood   NEGATIVE


 


Urine Nitrite   NEGATIVE


 


Urine Bilirubin   NEGATIVE


 


Urine Urobilinogen   NEGATIVE


 


Ur Leukocyte Esterase   SMALL H


 


Urine WBC (Auto)   3


 


Urine RBC (Auto)   2


 


Squamous Epi Cells Auto   2


 


Urine Mucus (Auto)   OCC


 


Urine Ascorbic Acid   NEGATIVE














- Diagnostic Test


Radiology reviewed: Reports reviewed





- EKG Interpretation by Me


EKG shows normal: Sinus rhythm


Rate: Normal


When compared to previous EKG there are: No significant change


Additional EKG results interpreted by me: 





19 12:28


Patient with T wave inversion in lead III which has been present on previous 

EKGs in the past.  QTc 403


19 12:28








Discharge





- Discharge


Clinical Impression: 


 Skin tear





Syncope


Qualifiers:


 Syncope type: unspecified Qualified Code(s): R55 - Syncope and collapse





Facial hematoma


Qualifiers:


 Encounter type: initial encounter Qualified Code(s): S00.83XA - Contusion of 

other part of head, initial encounter





Traumatic hematoma of forearm


Qualifiers:


 Encounter type: initial encounter Laterality: right Qualified Code(s): S50.11XA

- Contusion of right forearm, initial encounter





Condition: Stable


Disposition: ADMITTED AS OBSERVATION


Admitting Provider: Maria M (Hospitalist)


Unit Admitted: Telemetry

## 2019-09-14 NOTE — RADIOLOGY REPORT (SQ)
EXAM DESCRIPTION:  CHEST 2 VIEWS; T SPINE AP/LAT; L SPINE WHOLE; FOREARM RIGHT



COMPLETED DATE/TIME:  9/14/2019 10:26 am; 9/14/2019 10:27 am



REASON FOR STUDY:  fall, syncope; syncope, back pain; fall, syncope, r forearm injury



COMPARISON:  See below.



FINDINGS:  Two view chest:  1/11/2019 comparison.  Clear lungs.  No pneumothorax.  Stable cardiomedia
stinal silhouette.  Osteopenic and kyphotic without suggestion of acute fracture.

Two views right forearm:  Osteopenic.  Dorsal soft tissue swelling without radiopaque foreign body.  
No fracture.  No elbow effusion.  Carpus grossly intact.

Two view thoracic spine:  Osteopenic and kyphotic without gross fracture.

Four view lumbar spine:  Includes AP, bilateral oblique and cross-table lateral.  Osteopenic without 
fracture or malalignment.  Discs are relatively preserved.  No pars defect.



TECHNICAL DOCUMENTATION:  JOB ID:  [id number]



Reading location - IP/workstation name: [email or web address]

## 2019-09-14 NOTE — RADIOLOGY REPORT (SQ)
EXAM DESCRIPTION:  CT CERVICAL SPINE WITHOUT; CT FACIAL AREA WITHOUT; CT HEAD WITHOUT



COMPLETED DATE/TIME:  9/14/2019 10:14 am



REASON FOR STUDY:  fall, syncope, r periorbital injury



COMPARISON:  See below.



TECHNIQUE:  Axial images acquired through the brain, facial bones, cervical spine without intravenous
 contrast.  Images reviewed with brain, subdural, lung, soft tissue and bone windows.  Reconstructed 
coronal and sagittal MPR images reviewed.  Images stored on PACS.

All CT scanners at this facility use dose modulation, iterative reconstruction, and/or weight based d
osing when appropriate to reduce radiation dose to as low as reasonably achievable (ALARA).

[facility]: Dose Right  [facility]: CareDose    [facility]: Dose Right    [facility]: Teradose 4D    [facility]: Smart Technologies



RADIATION DOSE:  CT Rad equipment meets quality standard of care and radiation dose reduction techniq
ues were employed. CTDIvol: 21.5 mGy. DLP: 420 mGy-cm.; CT Rad equipment meets quality standard of ca
re and radiation dose reduction techniques were employed. CTDIvol: 30.4 mGy. DLP: 517 mGy-cm.; CT Rad
 equipment meets quality standard of care and radiation dose reduction techniques were employed. CTDI
vol: 53.2 mGy. DLP: 1070 mGy-cm. mGy.



LIMITATIONS:  None.



FINDINGS:  Brain

2019 comparison.  Right frontal scalp hematoma without underlying fracture.  No acute intracranial ab
normality.  Mild small vessel disease.  No hydrocephalus.  Mild sphenoid sinus mucosal thickening.

Face

No facial fracture.  Orbits intact.

Cervical spine

2014 comparison.  Osteopenic.  Spondylosis with disc disease most pronounced at C4-5 and C5-6.  No fr
acture or malalignment.  Soft tissues normal.



IMPRESSION:

1. Soft tissue injury right frontal scalp.

2. No acute intracranial abnormality.

3. No facial fracture.

4. No cervical spine fracture or malalignment.



TECHNICAL DOCUMENTATION:  JOB ID:  [id number]

Quality ID # [id number]: Final reports with documentation of one or more dose reduction techniques (e.g., Au
tomated exposure control, adjustment of the mA and/or kV according to patient size, use of iterative 
reconstruction technique)

 2011 [employer]- All Rights Reserved



Reading location - IP/workstation name: [email or web address]

## 2019-09-14 NOTE — RADIOLOGY REPORT (SQ)
EXAM DESCRIPTION:  CHEST 2 VIEWS; T SPINE AP/LAT; L SPINE WHOLE; FOREARM RIGHT



COMPLETED DATE/TIME:  9/14/2019 10:26 am; 9/14/2019 10:27 am



REASON FOR STUDY:  fall, syncope; syncope, back pain; fall, syncope, r forearm injury



COMPARISON:  See below.



FINDINGS:  Two view chest:  1/11/2019 comparison.  Clear lungs.  No pneumothorax.  Stable cardiomedia
stinal silhouette.  Osteopenic and kyphotic without suggestion of acute fracture.

Two views right forearm:  Osteopenic.  Dorsal soft tissue swelling without radiopaque foreign body.  
No fracture.  No elbow effusion.  Carpus grossly intact.

Two view thoracic spine:  Osteopenic and kyphotic without gross fracture.

Four view lumbar spine:  Includes AP, bilateral oblique and cross-table lateral.  Osteopenic without 
fracture or malalignment.  Discs are relatively preserved.  No pars defect.



TECHNICAL DOCUMENTATION:  JOB ID:  [id number]



Reading location - IP/workstation name: [email or web address]

## 2019-09-14 NOTE — EKG REPORT
SEVERITY:- BORDERLINE ECG -

SINUS RHYTHM

BORDERLINE T ABNORMALITIES, INFERIOR LEADS

:

Confirmed by: Antonieta Arenas MD 14-Sep-2019 19:45:21

## 2019-09-14 NOTE — PDOC H&P
History of Present Illness


Admission Date/PCP: 


  





  DEENA MORIN MD





History of Present Illness: 


JSEUS RODRIGUZE is a 79 year old female with a past medical history of 

hypertension, hyperlipidemia, history of colon resection, history of Mobitz type

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 

brought in after a questionable syncope.





Patient says she has been apparently fine until early this morning when she was 

walking on the sidewalk and fell the ground sustaining a hematoma on the head 

and the right arm.  She is not able to tell us the circumstances of her fall.  

She says she is not sure if she tripped or if she just lost her balance.  She 

says she is not sure if she syncopized but she thinks that she did not lose 

consciousness.  Denies fecal or urinary incontinence.  She says she did not feel

any dizziness or palpitations prior to the episode.  She denies she lost 

consciousness.  She did say she took a muscle relaxant yesterday and this morni

ng.  Her friend at bedside says she had a few episodes of easily getting winded 

in the past few weeks.








Past Medical History


Cardiac Medical History: Reports: Hyperlipidema


   Denies: Coronary Artery Disease, Myocardial Infarction, Hypertension


Pulmonary Medical History: 


   Denies: Asthma, Bronchitis, Chronic Obstructive Pulmonary Disease (COPD), 

Pneumonia


Neurological Medical History: 


   Denies: Seizures


GI Medical History: Reports: Gastroesophageal Reflux Disease


   Denies: Hepatitis, Hiatal Hernia


Musculoskeltal Medical History: Reports: Arthritis - GENERALIZED


Psychiatric Medical History: 


   Denies: Depression


Hematology: 


   Denies: Anemia, Sickle Cell Disease





Past Surgical History


Past Surgical History: Reports: Appendectomy,  Section, Other - Near 

complete colon resection


   Denies: Amputation, Hysterectomy, Mastectomy, Pacemaker





Social History


Smoking Status: Never Smoker


Frequency of Alcohol Use: None


Hx Recreational Drug Use: No


Drugs: None


Hx Prescription Drug Abuse: No





Family History


Family History: Reviewed & Not Pertinent


Parental Family History Reviewed: Yes - No premature CAD


Children Family History Reviewed: No


Sibling(s) Family History Reviewed.: No





Medication/Allergy


Home Medications: 








Ergocalciferol (Vitamin D2) [Drisdol 50,000 Unit (1.25MG) Capsule] 50,000 unit 

PO ALVA@1000 19 


Esomeprazole Magnesium [Nexium 24Hr] 20 mg PO BID 19 


Fenofibrate Nanocrystallized [Tricor 48 mg Tablet] 48 mg PO QHS 19 


Hydrochlorothiazide [Hydrodiuril 12.5 mg Tablet] 12.5 mg PO DAILY 19 


Metoprolol Succinate [Toprol Xl 25 mg Tab.sr] 25 mg PO Q12 19 


Celecoxib 100 mg PO Q12HP PRN 19 


Cetirizine HCl [Zyrtec 10 mg Tablet] 10 mg PO DAILY 19 


Cyclobenzaprine HCl [Flexeril 5 mg Tablet] 5 mg PO Q12 19 


Ferrous Sulfate [Feosol 325 mg Tablet] 325 mg PO BID 19 


Fluticasone/Vilanterol [Breo Ellipta 200-25 Mcg INH] 1 inh IH DAILY 19 


Pravastatin Sodium 10 mg PO QHS 19 








Allergies/Adverse Reactions: 


                                        





bimatoprost [From Lumigan] Allergy (Mild, Verified 17 09:03)


   IRRITATION


brimonidine tartrate [From Alphagan] Allergy (Mild, Verified 17 09:03)


   RASH, IRRITATION


niacin Adverse Reaction (Verified 17 09:03)


   











Review of Systems


All systems: reviewed and no additional remarkable complaints except as stated -

As mentioned in HPI





Physical Exam


Vital Signs: 


                                        











Temp Pulse Resp BP Pulse Ox


 


 97.5 F   91   19   140/89 H  97 


 


 19 08:25  19 08:25  19 10:27  19 08:25  19 10:27








                                 Intake & Output











 09/13/19 09/14/19 09/15/19





 06:59 06:59 06:59


 


Weight   160 lb











General appearance: PRESENT: no acute distress, well-developed, well-nourished


Head exam: PRESENT: atraumatic, normocephalic, other - Hematoma right temporal 

parietal area


Eye exam: PRESENT: conjunctiva pink, EOMI, PERRLA.  ABSENT: scleral icterus


Ear exam: PRESENT: normal external ear exam


Mouth exam: PRESENT: moist, tongue midline


Neck exam: ABSENT: carotid bruit, JVD, lymphadenopathy, thyromegaly


Respiratory exam: PRESENT: clear to auscultation nieves.  ABSENT: rales, rhonchi, 

wheezes


Cardiovascular exam: PRESENT: RRR.  ABSENT: diastolic murmur, rubs, systolic 

murmur


Pulses: PRESENT: normal dorsalis pedis pul


GI/Abdominal exam: PRESENT: normal bowel sounds, soft.  ABSENT: distended, 

guarding, mass, organolmegaly, rebound, tenderness


Rectal exam: PRESENT: deferred


Neurological exam: PRESENT: alert, awake, oriented to person, oriented to place,

oriented to time, oriented to situation, CN II-XII grossly intact.  ABSENT: 

motor sensory deficit


Skin exam: PRESENT: dry, intact, warm.  ABSENT: cyanosis, rash





Results


Laboratory Results: 


                                        





                                 19 09:44 





                                 19 09:44 





                                        











  19





  09:44 09:44 09:50


 


WBC  7.6  


 


RBC  4.23  


 


Hgb  12.0  


 


Hct  36.3  


 


MCV  86  


 


MCH  28.3  


 


MCHC  33.0  


 


RDW  15.4 H  


 


Plt Count  198  


 


Seg Neutrophils %  74.6  


 


Sodium   139.9 


 


Potassium   4.4 


 


Chloride   104 


 


Carbon Dioxide   28 


 


Anion Gap   8 


 


BUN   21 H 


 


Creatinine   0.69 


 


Est GFR ( Amer)   > 60 


 


Glucose   108 


 


Calcium   9.2 


 


Magnesium   1.9 


 


Total Bilirubin   0.3 


 


AST   27 


 


Alkaline Phosphatase   47 


 


Total Protein   6.6 


 


Albumin   3.6 


 


Urine Color    YELLOW


 


Urine Appearance    CLEAR


 


Urine pH    5.0


 


Ur Specific Gravity    1.025


 


Urine Protein    NEGATIVE


 


Urine Glucose (UA)    NEGATIVE


 


Urine Ketones    NEGATIVE


 


Urine Blood    NEGATIVE


 


Urine Nitrite    NEGATIVE


 


Ur Leukocyte Esterase    SMALL H


 


Urine WBC (Auto)    3


 


Urine RBC (Auto)    2








                                        











  19





  09:44


 


Troponin I  < 0.012











Impressions: 


                                        





Cervical Spine CT  19 09:18


IMPRESSION:


1. Soft tissue injury right frontal scalp.


2. No acute intracranial abnormality.


3. No facial fracture.


4. No cervical spine fracture or malalignment.


 








Facial Bones CT  19 09:18


IMPRESSION:


1. Soft tissue injury right frontal scalp.


2. No acute intracranial abnormality.


3. No facial fracture.


4. No cervical spine fracture or malalignment.


 








Head CT  19 09:18


IMPRESSION:


1. Soft tissue injury right frontal scalp.


2. No acute intracranial abnormality.


3. No facial fracture.


4. No cervical spine fracture or malalignment.


 














Assessment and Plan





- Diagnosis


(1) Fall


Is this a current diagnosis for this admission?: Yes   


Plan: 


Trauma work-up has been unremarkable.








(2) Pre-syncope


Is this a current diagnosis for this admission?: Yes   


Plan: 


Patient unable to give a good history if she actually syncopized which he said 

she does not think she lost consciousness.  Possibility that intake of muscle 

relaxants might have been related to her fall.  Regardless, we will have her 

pacemaker interrogated.  We will check orthostatic vital signs.  Also check 

carotid Doppler.








(3) Hypertension


Is this a current diagnosis for this admission?: Yes   


Plan: 


Resume home meds depending on orthostatic blood pressures.








(4) History of colon resection


Is this a current diagnosis for this admission?: Yes   





- Time


Time Spent with patient: 25-34 minutes

## 2019-09-14 NOTE — RADIOLOGY REPORT (SQ)
EXAM DESCRIPTION:  CHEST 2 VIEWS; T SPINE AP/LAT; L SPINE WHOLE; FOREARM RIGHT



COMPLETED DATE/TIME:  9/14/2019 10:26 am; 9/14/2019 10:27 am



REASON FOR STUDY:  fall, syncope; syncope, back pain; fall, syncope, r forearm injury



COMPARISON:  See below.



FINDINGS:  Two view chest:  1/11/2019 comparison.  Clear lungs.  No pneumothorax.  Stable cardiomedia
stinal silhouette.  Osteopenic and kyphotic without suggestion of acute fracture.

Two views right forearm:  Osteopenic.  Dorsal soft tissue swelling without radiopaque foreign body.  
No fracture.  No elbow effusion.  Carpus grossly intact.

Two view thoracic spine:  Osteopenic and kyphotic without gross fracture.

Four view lumbar spine:  Includes AP, bilateral oblique and cross-table lateral.  Osteopenic without 
fracture or malalignment.  Discs are relatively preserved.  No pars defect.



TECHNICAL DOCUMENTATION:  JOB ID:  [id number]



Reading location - IP/workstation name: [email or web address]

## 2019-09-14 NOTE — RADIOLOGY REPORT (SQ)
EXAM DESCRIPTION:  CHEST 2 VIEWS; T SPINE AP/LAT; L SPINE WHOLE; FOREARM RIGHT



COMPLETED DATE/TIME:  9/14/2019 10:26 am; 9/14/2019 10:27 am



REASON FOR STUDY:  fall, syncope; syncope, back pain; fall, syncope, r forearm injury



COMPARISON:  See below.



FINDINGS:  Two view chest:  1/11/2019 comparison.  Clear lungs.  No pneumothorax.  Stable cardiomedia
stinal silhouette.  Osteopenic and kyphotic without suggestion of acute fracture.

Two views right forearm:  Osteopenic.  Dorsal soft tissue swelling without radiopaque foreign body.  
No fracture.  No elbow effusion.  Carpus grossly intact.

Two view thoracic spine:  Osteopenic and kyphotic without gross fracture.

Four view lumbar spine:  Includes AP, bilateral oblique and cross-table lateral.  Osteopenic without 
fracture or malalignment.  Discs are relatively preserved.  No pars defect.



TECHNICAL DOCUMENTATION:  JOB ID:  [id number]



Reading location - IP/workstation name: [email or web address]

## 2019-09-14 NOTE — RADIOLOGY REPORT (SQ)
EXAM DESCRIPTION:  CT CERVICAL SPINE WITHOUT; CT FACIAL AREA WITHOUT; CT HEAD WITHOUT



COMPLETED DATE/TIME:  9/14/2019 10:14 am



REASON FOR STUDY:  fall, syncope, r periorbital injury



COMPARISON:  See below.



TECHNIQUE:  Axial images acquired through the brain, facial bones, cervical spine without intravenous
 contrast.  Images reviewed with brain, subdural, lung, soft tissue and bone windows.  Reconstructed 
coronal and sagittal MPR images reviewed.  Images stored on PACS.

All CT scanners at this facility use dose modulation, iterative reconstruction, and/or weight based d
osing when appropriate to reduce radiation dose to as low as reasonably achievable (ALARA).

[facility]: Dose Right  [facility]: CareDose    [facility]: Dose Right    [facility]: Teradose 4D    [facility]: Smart Technologies



RADIATION DOSE:  CT Rad equipment meets quality standard of care and radiation dose reduction techniq
ues were employed. CTDIvol: 21.5 mGy. DLP: 420 mGy-cm.; CT Rad equipment meets quality standard of ca
re and radiation dose reduction techniques were employed. CTDIvol: 30.4 mGy. DLP: 517 mGy-cm.; CT Rad
 equipment meets quality standard of care and radiation dose reduction techniques were employed. CTDI
vol: 53.2 mGy. DLP: 1070 mGy-cm. mGy.



LIMITATIONS:  None.



FINDINGS:  Brain

2019 comparison.  Right frontal scalp hematoma without underlying fracture.  No acute intracranial ab
normality.  Mild small vessel disease.  No hydrocephalus.  Mild sphenoid sinus mucosal thickening.

Face

No facial fracture.  Orbits intact.

Cervical spine

2014 comparison.  Osteopenic.  Spondylosis with disc disease most pronounced at C4-5 and C5-6.  No fr
acture or malalignment.  Soft tissues normal.



IMPRESSION:

1. Soft tissue injury right frontal scalp.

2. No acute intracranial abnormality.

3. No facial fracture.

4. No cervical spine fracture or malalignment.



TECHNICAL DOCUMENTATION:  JOB ID:  [id number]

Quality ID # [id number]: Final reports with documentation of one or more dose reduction techniques (e.g., Au
tomated exposure control, adjustment of the mA and/or kV according to patient size, use of iterative 
reconstruction technique)

 2011 [employer]- All Rights Reserved



Reading location - IP/workstation name: [email or web address]

## 2019-09-15 RX ADMIN — FLUTICASONE FUROATE AND VILANTEROL TRIFENATATE SCH INHALER: 200; 25 POWDER RESPIRATORY (INHALATION) at 09:56

## 2019-09-15 RX ADMIN — HEPARIN SODIUM SCH UNIT: 5000 INJECTION, SOLUTION INTRAVENOUS; SUBCUTANEOUS at 21:13

## 2019-09-15 RX ADMIN — FERROUS SULFATE TAB 325 MG (65 MG ELEMENTAL FE) SCH MG: 325 (65 FE) TAB at 17:35

## 2019-09-15 RX ADMIN — HEPARIN SODIUM SCH UNIT: 5000 INJECTION, SOLUTION INTRAVENOUS; SUBCUTANEOUS at 09:53

## 2019-09-15 RX ADMIN — METOPROLOL SUCCINATE SCH MG: 25 TABLET, EXTENDED RELEASE ORAL at 09:47

## 2019-09-15 RX ADMIN — Medication SCH: at 21:16

## 2019-09-15 RX ADMIN — FERROUS SULFATE TAB 325 MG (65 MG ELEMENTAL FE) SCH MG: 325 (65 FE) TAB at 09:47

## 2019-09-15 RX ADMIN — FENOFIBRATE SCH MG: 48 TABLET ORAL at 21:14

## 2019-09-15 RX ADMIN — Medication SCH: at 05:35

## 2019-09-15 RX ADMIN — SODIUM CHLORIDE PRN MLS/HR: 9 INJECTION, SOLUTION INTRAVENOUS at 18:19

## 2019-09-15 RX ADMIN — Medication SCH: at 14:23

## 2019-09-15 RX ADMIN — PANTOPRAZOLE SODIUM SCH MG: 20 TABLET, DELAYED RELEASE ORAL at 21:14

## 2019-09-15 RX ADMIN — KETOROLAC TROMETHAMINE PRN MG: 30 INJECTION, SOLUTION INTRAMUSCULAR at 18:19

## 2019-09-15 RX ADMIN — PANTOPRAZOLE SODIUM SCH MG: 20 TABLET, DELAYED RELEASE ORAL at 09:47

## 2019-09-15 NOTE — RADIOLOGY REPORT (SQ)
EXAM DESCRIPTION:  KNEE RIGHT 4 VIEWS



COMPLETED DATE/TIME:  9/14/2019 8:39 pm



REASON FOR STUDY:  pain



COMPARISON:  None.



NUMBER OF VIEWS:  Four views right knee



LIMITATIONS:  None.



FINDINGS:  Osteopenic.  Mild medial compartment joint space narrowing with osteophytes.  No fracture.
  No effusion.

OTHER: No other significant finding.



IMPRESSION:  DJD.



TECHNICAL DOCUMENTATION:  JOB ID:  [id number]



Reading location - IP/workstation name: [email or web address]

## 2019-09-15 NOTE — PDOC PROGRESS REPORT
Subjective


Progress Note for:: 09/15/19


Subjective:: 


JESUS RODRIGUEZ is a 79 year old female with a past medical history of 

hypertension, hyperlipidemia, history of colon resection, history of Mobitz type

II, prior pacemaker placement in January 2019 for significant bradycardia was 

brought in after a questionable syncope.





No acute event overnight but presyncope work-up has been remarkable for positive

orthostatic vital signs so far.  She denies any chest pain, shortness of breath 

or dizziness.  Orthostatic vital signs this morning are still positive.  

Continue IV fluids and discontinue antihypertensives for now.  Carotid Doppler 

and pacemaker interrogation pending.


Reason For Visit: 


PRESYNCOPE








Physical Exam


Vital Signs: 


                                        











Temp Pulse Resp BP Pulse Ox


 


 98.4 F   72   18   154/81 H  98 


 


 09/15/19 15:50  09/15/19 15:50  09/15/19 15:50  09/15/19 15:50  09/15/19 15:50








                                 Intake & Output











 09/14/19 09/15/19 09/16/19





 06:59 06:59 06:59


 


Intake Total  730 480


 


Balance  730 480


 


Weight  146 lb 2.664 oz 











General appearance: PRESENT: no acute distress, well-developed, well-nourished


Head exam: PRESENT: normocephalic


Eye exam: PRESENT: conjunctiva pink, EOMI, PERRLA.  ABSENT: scleral icterus


Ear exam: PRESENT: normal external ear exam


Mouth exam: PRESENT: moist, tongue midline


Neck exam: ABSENT: carotid bruit, JVD, lymphadenopathy, thyromegaly


Respiratory exam: PRESENT: clear to auscultation nieves.  ABSENT: rales, rhonchi, w

heezes


Cardiovascular exam: PRESENT: RRR.  ABSENT: diastolic murmur, rubs, systolic 

murmur


Pulses: PRESENT: normal dorsalis pedis pul


GI/Abdominal exam: PRESENT: normal bowel sounds, soft.  ABSENT: distended, 

guarding, mass, organolmegaly, rebound, tenderness


Rectal exam: PRESENT: deferred


Extremities exam: PRESENT: full ROM.  ABSENT: calf tenderness, clubbing, pedal 

edema


Neurological exam: PRESENT: alert, awake, oriented to person, oriented to place,

oriented to time, oriented to situation, CN II-XII grossly intact.  ABSENT: 

motor sensory deficit





Results


Laboratory Results: 


                                        





                                 09/14/19 09:44 





                                 09/14/19 09:44 





                                        











  09/14/19





  09:44


 


TSH  2.39








                                        











  09/14/19





  09:44


 


Troponin I  < 0.012











Impressions: 


                                        





Knee X-Ray  09/14/19 00:00


IMPRESSION:  DJD.


 








Cervical Spine CT  09/14/19 09:18


IMPRESSION:


1. Soft tissue injury right frontal scalp.


2. No acute intracranial abnormality.


3. No facial fracture.


4. No cervical spine fracture or malalignment.


 








Facial Bones CT  09/14/19 09:18


IMPRESSION:


1. Soft tissue injury right frontal scalp.


2. No acute intracranial abnormality.


3. No facial fracture.


4. No cervical spine fracture or malalignment.


 








Head CT  09/14/19 09:18


IMPRESSION:


1. Soft tissue injury right frontal scalp.


2. No acute intracranial abnormality.


3. No facial fracture.


4. No cervical spine fracture or malalignment.


 














Assessment and Plan





- Diagnosis


(1) Pre-syncope


Is this a current diagnosis for this admission?: Yes   


Plan: 


9/14: Patient unable to give a good history if she actually syncopized which he 

said she does not think she lost consciousness.  Possibility that intake of 

muscle relaxants might have been related to her fall.  Regardless, we will have 

her pacemaker interrogated.  We will check orthostatic vital signs.  Also check 

carotid Doppler.





9/15: Presyncope work-up has been remarkable for positive orthostatic vital 

signs so far.  She denies any chest pain, shortness of breath or dizziness.  

Orthostatic vital signs this morning are still positive.  Continue IV fluids and

discontinue antihypertensives for now.  Carotid Doppler and pacemaker 

interrogation pending.








(2) Fall


Is this a current diagnosis for this admission?: Yes   


Plan: 


Trauma work-up has been unremarkable.








(3) Hypertension


Is this a current diagnosis for this admission?: Yes   


Plan: 


Hold antihypertensives for now.








(4) History of colon resection


Is this a current diagnosis for this admission?: Yes   





- Time


Time Spent with patient: 15-24 minutes

## 2019-09-16 VITALS — DIASTOLIC BLOOD PRESSURE: 80 MMHG | SYSTOLIC BLOOD PRESSURE: 148 MMHG

## 2019-09-16 RX ADMIN — Medication SCH: at 05:46

## 2019-09-16 RX ADMIN — Medication SCH: at 13:19

## 2019-09-16 RX ADMIN — FERROUS SULFATE TAB 325 MG (65 MG ELEMENTAL FE) SCH MG: 325 (65 FE) TAB at 09:28

## 2019-09-16 RX ADMIN — PANTOPRAZOLE SODIUM SCH MG: 20 TABLET, DELAYED RELEASE ORAL at 09:28

## 2019-09-16 RX ADMIN — HEPARIN SODIUM SCH UNIT: 5000 INJECTION, SOLUTION INTRAVENOUS; SUBCUTANEOUS at 09:26

## 2019-09-16 RX ADMIN — FLUTICASONE FUROATE AND VILANTEROL TRIFENATATE SCH INHALER: 200; 25 POWDER RESPIRATORY (INHALATION) at 09:28

## 2019-09-16 NOTE — PDOC DISCHARGE SUMMARY
General





- Admit/Disc Date/PCP


Admission Date/Primary Care Provider: 


  09/14/19 14:21





  DEENA MORIN MD





Discharge Date: 09/16/19





- Discharge Diagnosis


(1) Pre-syncope


Is this a current diagnosis for this admission?: Yes   





(2) Fall


Is this a current diagnosis for this admission?: Yes   





(3) Hypertension


Is this a current diagnosis for this admission?: Yes   





(4) History of colon resection


Is this a current diagnosis for this admission?: Yes   





- Additional Information


Resuscitation Status: Full Code


Discharge Diet: As Tolerated


Discharge Activity: Activity As Tolerated


Prescriptions: 


Lidocaine [Ztlido] 1 each TP DAILYP PRN #5 adh..patch


 PRN Reason: 


Home Medications: 








Ergocalciferol (Vitamin D2) [Drisdol 50,000 unit (1.25MG) Capsule] 50,000 unit 

PO ALVA@1000 01/11/19 


Esomeprazole Magnesium [Nexium 24Hr] 20 mg PO BID 01/11/19 


Fenofibrate Nanocrystallized [Tricor 48 mg Tablet] 48 mg PO QHS 01/11/19 


Celecoxib 100 mg PO Q12HP PRN 09/14/19 


Cetirizine HCl [Zyrtec 10 mg Tablet] 10 mg PO DAILY 09/14/19 


Ferrous Sulfate [Feosol 325 mg Tablet] 325 mg PO BID 09/14/19 


Fluticasone/Vilanterol [Breo Ellipta 200-25 Mcg INH] 1 inh IH DAILY 09/14/19 


Pravastatin Sodium 10 mg PO QHS 09/14/19 


Lidocaine [Ztlido] 1 each TP DAILYP PRN #5 adh..patch 09/16/19 


Metoprolol Succinate [Toprol Xl 25 mg Tab.sr] 12.5 mg PO Q12 #0 09/16/19 











History of Present Illness


History of Present Illness: 


JESUS RODRIGUEZ is a 79 year old female with a past medical history of 

hypertension, hyperlipidemia, history of colon resection, history of Mobitz type

II, prior pacemaker placement in January 2019 for significant bradycardia was 

brought in after a questionable syncope.





Patient says she has been apparently fine until early this morning when she was 

walking on the sidewalk and fell the ground sustaining a hematoma on the head 

and the right arm.  She is not able to tell us the circumstances of her fall.  

She says she is not sure if she tripped or if she just lost her balance.  She 

says she is not sure if she syncopized but she thinks that she did not lose 

consciousness.  Denies fecal or urinary incontinence.  She says she did not feel

any dizziness or palpitations prior to the episode.  She denies she lost conscio

usness.  She did say she took a muscle relaxant yesterday and this morning.  Her

friend at bedside says she had a few episodes of easily getting winded in the 

past few weeks.








Hospital Course


Hospital Course: 


JESUS RODRIGUEZ is a 79 year old female with a past medical history of 

hypertension, hyperlipidemia, history of colon resection, history of Mobitz type

II, prior pacemaker placement in January 2019 for significant bradycardia was 

brought in after a questionable syncope.





Presyncope work-up has been remarkable for positive orthostatic vital signs.  

She sustained some hematoma in the face and a small laceration in the right arm 

but her trauma work-up was unremarkable.  Her pacemaker interrogation was 

unremarkable.  Carotid Doppler was also negative.  Her antihypertensives were 

held and she was started on IV fluids.  Her orthostatic blood pressures did 

improve and orthostasis resolved on day of discharge.  Her hydrochlorothiazide 

was discontinued and her Lopressor was decreased to just 12.5 mg twice daily.  

She was also advised to refrain from taking Flexeril.  She will closely follow-

up with her cardiologist and PCP.








Physical Exam


Vital Signs: 


                                        











Temp Pulse Resp BP Pulse Ox


 


 98.0 F   76   16   148/80 H  96 


 


 09/16/19 13:45  09/16/19 13:45  09/16/19 13:45  09/16/19 13:45  09/16/19 13:45








                                 Intake & Output











 09/15/19 09/16/19 09/17/19





 06:59 06:59 06:59


 


Intake Total 730 1980 360


 


Balance 730 1980 360


 


Weight 146 lb 2.664 oz 143 lb 1.28 oz 











General appearance: PRESENT: no acute distress, well-developed, well-nourished


Head exam: PRESENT: other - hematomas onr ight sided facial area


Eye exam: PRESENT: conjunctiva pink, EOMI, PERRLA.  ABSENT: scleral icterus


Ear exam: PRESENT: normal external ear exam


Mouth exam: PRESENT: moist, tongue midline


Neck exam: ABSENT: carotid bruit, JVD, lymphadenopathy, thyromegaly


Respiratory exam: PRESENT: clear to auscultation nieves.  ABSENT: rales, rhonchi, 

wheezes


Cardiovascular exam: PRESENT: RRR.  ABSENT: diastolic murmur, rubs, systolic 

murmur


Pulses: PRESENT: normal dorsalis pedis pul


GI/Abdominal exam: PRESENT: normal bowel sounds, soft.  ABSENT: distended, 

guarding, mass, organolmegaly, rebound, tenderness


Rectal exam: PRESENT: deferred


Extremities exam: PRESENT: full ROM.  ABSENT: calf tenderness, clubbing, pedal 

edema


Neurological exam: PRESENT: alert, awake, oriented to person, oriented to place,

oriented to time, oriented to situation, CN II-XII grossly intact.  ABSENT: 

motor sensory deficit





Results


Laboratory Results: 


                                        





                                 09/14/19 09:44 





                                 09/14/19 09:44 





                                        











  09/14/19





  09:44


 


Troponin I  < 0.012











Impressions: 


                                        





Knee X-Ray  09/14/19 00:00


IMPRESSION:  DJD.


 








Cervical Spine CT  09/14/19 09:18


IMPRESSION:


1. Soft tissue injury right frontal scalp.


2. No acute intracranial abnormality.


3. No facial fracture.


4. No cervical spine fracture or malalignment.


 








Facial Bones CT  09/14/19 09:18


IMPRESSION:


1. Soft tissue injury right frontal scalp.


2. No acute intracranial abnormality.


3. No facial fracture.


4. No cervical spine fracture or malalignment.


 








Head CT  09/14/19 09:18


IMPRESSION:


1. Soft tissue injury right frontal scalp.


2. No acute intracranial abnormality.


3. No facial fracture.


4. No cervical spine fracture or malalignment.


 








Carotid Doppler Study  09/16/19 00:00


IMPRESSION:  NO HEMODYNAMICALLY SIGNIFICANT STENOSIS.


 














Qualifiers





- *


PATIENT BEING DISCHARGED WITH ANY OF THE FOLLOWING DIAGNOSIS: No





Acute Heart Failure





- **


Is this a Heart Failure Patient?: No

## 2019-09-16 NOTE — RADIOLOGY REPORT (SQ)
EXAM DESCRIPTION:  CAROTID DOPPLER



COMPLETED DATE/TIME:  9/16/2019 12:02 pm



REASON FOR STUDY:  syncope R53.1  WEAKNESS



COMPARISON:  None.



TECHNIQUE:  Grayscale ultrasound, Doppler velocity and spectra, and color Doppler images acquired of 
the extra-cranial carotid and vertebral arteries. Images stored on PACS.



LIMITATIONS:  None.



FINDINGS:  RIGHT CAROTID

CCA Velocities: Within normal limits.

ICA Velocities

Peak systolic 54 cm/s.

End diastolic 20 cm/s.

Proximal ICA/CCA peak systolic ratio 0.82.

Spectra normal. No significant plaque.

LEFT CAROTID

CCA Velocities: Within normal limits.

ICA Velocities

Peak systolic 72 cm/s.

End diastolic 24 cm/s.

Proximal ICA/CCA peak systolic ratio 1.47.

Spectra normal. No significant plaque.

VERTEBRAL ARTERIES: Antegrade flow.  Normal waveforms.

SUBCLAVIAN ARTERIES: No finding.

OTHER: No other significant finding.



IMPRESSION:  NO HEMODYNAMICALLY SIGNIFICANT STENOSIS.



COMMENT:  Quality ID #[id number]:  Velocity criteria are extrapolated from the diameter data as defined by t
he Society of Radiologists in Ultrasound Consensus Conference. Radiology 2003: 229; 340-346.



TECHNICAL DOCUMENTATION:  JOB ID:  [id number]

 2011 [employer]- All Rights Reserved



Reading location - IP/workstation name: [email or web address]